# Patient Record
Sex: FEMALE | Race: WHITE | Employment: OTHER | URBAN - NONMETROPOLITAN AREA
[De-identification: names, ages, dates, MRNs, and addresses within clinical notes are randomized per-mention and may not be internally consistent; named-entity substitution may affect disease eponyms.]

---

## 2017-04-25 ENCOUNTER — OFFICE VISIT (OUTPATIENT)
Dept: FAMILY MEDICINE CLINIC | Age: 82
End: 2017-04-25
Payer: MEDICARE

## 2017-04-25 VITALS
WEIGHT: 126 LBS | BODY MASS INDEX: 25.4 KG/M2 | OXYGEN SATURATION: 97 % | SYSTOLIC BLOOD PRESSURE: 128 MMHG | HEIGHT: 59 IN | HEART RATE: 54 BPM | DIASTOLIC BLOOD PRESSURE: 60 MMHG

## 2017-04-25 DIAGNOSIS — H35.3190 MACULAR DEGENERATION, DRY: ICD-10-CM

## 2017-04-25 DIAGNOSIS — Z09 HOSPITAL DISCHARGE FOLLOW-UP: ICD-10-CM

## 2017-04-25 DIAGNOSIS — E03.9 HYPOTHYROIDISM, UNSPECIFIED TYPE: ICD-10-CM

## 2017-04-25 DIAGNOSIS — I10 ESSENTIAL HYPERTENSION: ICD-10-CM

## 2017-04-25 DIAGNOSIS — E78.1 HYPERTRIGLYCERIDEMIA: ICD-10-CM

## 2017-04-25 PROCEDURE — G8420 CALC BMI NORM PARAMETERS: HCPCS | Performed by: FAMILY MEDICINE

## 2017-04-25 PROCEDURE — 1036F TOBACCO NON-USER: CPT | Performed by: FAMILY MEDICINE

## 2017-04-25 PROCEDURE — 1123F ACP DISCUSS/DSCN MKR DOCD: CPT | Performed by: FAMILY MEDICINE

## 2017-04-25 PROCEDURE — 99214 OFFICE O/P EST MOD 30 MIN: CPT | Performed by: FAMILY MEDICINE

## 2017-04-25 PROCEDURE — 4040F PNEUMOC VAC/ADMIN/RCVD: CPT | Performed by: FAMILY MEDICINE

## 2017-04-25 PROCEDURE — 1090F PRES/ABSN URINE INCON ASSESS: CPT | Performed by: FAMILY MEDICINE

## 2017-04-25 PROCEDURE — G8427 DOCREV CUR MEDS BY ELIG CLIN: HCPCS | Performed by: FAMILY MEDICINE

## 2017-08-01 ENCOUNTER — TELEPHONE (OUTPATIENT)
Dept: FAMILY MEDICINE CLINIC | Age: 82
End: 2017-08-01

## 2017-08-07 ENCOUNTER — TELEPHONE (OUTPATIENT)
Dept: FAMILY MEDICINE CLINIC | Age: 82
End: 2017-08-07

## 2017-08-17 ENCOUNTER — OFFICE VISIT (OUTPATIENT)
Dept: OPTOMETRY | Age: 82
End: 2017-08-17
Payer: MEDICARE

## 2017-08-17 DIAGNOSIS — H52.03 HYPEROPIA OF BOTH EYES WITH ASTIGMATISM AND PRESBYOPIA: Primary | ICD-10-CM

## 2017-08-17 DIAGNOSIS — H52.203 HYPEROPIA OF BOTH EYES WITH ASTIGMATISM AND PRESBYOPIA: Primary | ICD-10-CM

## 2017-08-17 DIAGNOSIS — H52.4 HYPEROPIA OF BOTH EYES WITH ASTIGMATISM AND PRESBYOPIA: Primary | ICD-10-CM

## 2017-08-17 PROCEDURE — G8419 CALC BMI OUT NRM PARAM NOF/U: HCPCS | Performed by: OPTOMETRIST

## 2017-08-17 PROCEDURE — 1123F ACP DISCUSS/DSCN MKR DOCD: CPT | Performed by: OPTOMETRIST

## 2017-08-17 PROCEDURE — G8427 DOCREV CUR MEDS BY ELIG CLIN: HCPCS | Performed by: OPTOMETRIST

## 2017-08-17 PROCEDURE — 99213 OFFICE O/P EST LOW 20 MIN: CPT | Performed by: OPTOMETRIST

## 2017-08-17 PROCEDURE — 4040F PNEUMOC VAC/ADMIN/RCVD: CPT | Performed by: OPTOMETRIST

## 2017-08-17 PROCEDURE — 1036F TOBACCO NON-USER: CPT | Performed by: OPTOMETRIST

## 2017-08-17 PROCEDURE — 1090F PRES/ABSN URINE INCON ASSESS: CPT | Performed by: OPTOMETRIST

## 2017-08-17 RX ORDER — BENOXINATE HCL/FLUORESCEIN SOD 0.4%-0.25%
1 DROPS OPHTHALMIC (EYE) ONCE
Status: COMPLETED | OUTPATIENT
Start: 2017-08-17 | End: 2017-08-17

## 2017-08-17 RX ADMIN — Medication 1 DROP: at 09:03

## 2017-08-17 ASSESSMENT — REFRACTION_WEARINGRX
OD_AXIS: 120
OS_SPHERE: +3.25
OS_AXIS: 104
OD_ADD: +2.75
OD_CYLINDER: -0.50
OS_ADD: +2.75
SPECS_TYPE: BIFOCAL
OD_SPHERE: +2.50
OS_CYLINDER: -1.00

## 2017-08-17 ASSESSMENT — VISUAL ACUITY
OD_CC: 20/50 OU
CORRECTION_TYPE: GLASSES
OS_CC: 20/50
METHOD: SNELLEN - LINEAR

## 2017-08-17 ASSESSMENT — REFRACTION_MANIFEST
OD_CYLINDER: -0.50
OD_SPHERE: +2.75
OD_ADD: +2.75
OS_SPHERE: +3.50
OD_AXIS: 010
OS_AXIS: 090
OS_ADD: +2.75
OS_CYLINDER: -1.00

## 2017-08-17 ASSESSMENT — TONOMETRY
OD_IOP_MMHG: 18
OS_IOP_MMHG: 18
IOP_METHOD: APPLANATION W FLURESS DROP

## 2017-08-17 ASSESSMENT — SLIT LAMP EXAM - LIDS
COMMENTS: NORMAL
COMMENTS: NORMAL

## 2017-10-16 ENCOUNTER — HOSPITAL ENCOUNTER (OUTPATIENT)
Dept: LAB | Age: 82
Setting detail: SPECIMEN
Discharge: HOME | End: 2017-10-16
Payer: MEDICARE

## 2017-10-16 DIAGNOSIS — R30.0 DYSURIA: Primary | ICD-10-CM

## 2017-10-16 DIAGNOSIS — R30.0 DYSURIA: ICD-10-CM

## 2017-10-16 LAB
-: ABNORMAL
AMORPHOUS: ABNORMAL
BACTERIA: ABNORMAL
BILIRUBIN URINE: NEGATIVE
CASTS UA: ABNORMAL /LPF (ref 0–2)
COLOR: ABNORMAL
COMMENT UA: ABNORMAL
CRYSTALS, UA: ABNORMAL /HPF
EPITHELIAL CELLS UA: ABNORMAL /HPF (ref 0–5)
GLUCOSE URINE: NEGATIVE
KETONES, URINE: NEGATIVE
LEUKOCYTE ESTERASE, URINE: ABNORMAL
MUCUS: ABNORMAL
NITRITE, URINE: NEGATIVE
OTHER OBSERVATIONS UA: ABNORMAL
PH UA: 6 (ref 5–6)
PROTEIN UA: ABNORMAL
RBC UA: ABNORMAL /HPF (ref 0–4)
RENAL EPITHELIAL, UA: ABNORMAL /HPF
SPECIFIC GRAVITY UA: 1.02 (ref 1.01–1.02)
TRICHOMONAS: ABNORMAL
TURBIDITY: ABNORMAL
URINE HGB: ABNORMAL
UROBILINOGEN, URINE: NORMAL
WBC UA: >50 /HPF (ref 0–4)
YEAST: ABNORMAL

## 2017-10-16 PROCEDURE — 87186 SC STD MICRODIL/AGAR DIL: CPT

## 2017-10-16 PROCEDURE — 87088 URINE BACTERIA CULTURE: CPT

## 2017-10-16 PROCEDURE — 81001 URINALYSIS AUTO W/SCOPE: CPT

## 2017-10-16 PROCEDURE — 87086 URINE CULTURE/COLONY COUNT: CPT

## 2017-10-17 ENCOUNTER — TELEPHONE (OUTPATIENT)
Dept: FAMILY MEDICINE CLINIC | Age: 82
End: 2017-10-17

## 2017-10-18 ENCOUNTER — TELEPHONE (OUTPATIENT)
Dept: FAMILY MEDICINE CLINIC | Age: 82
End: 2017-10-18

## 2017-10-18 LAB
CULTURE: ABNORMAL
CULTURE: ABNORMAL
Lab: ABNORMAL
ORGANISM: ABNORMAL
SPECIMEN DESCRIPTION: ABNORMAL
SPECIMEN DESCRIPTION: ABNORMAL
STATUS: ABNORMAL

## 2017-10-18 NOTE — TELEPHONE ENCOUNTER
----- Message from Lauren Austin MD sent at 10/18/2017 10:46 AM EDT -----  Was called in bactrim yesterday ,based on urine culture senstivity will switch to cipro 550 mg bid x 7days,script to be called in ,stop bactrim.

## 2017-10-25 ENCOUNTER — HOSPITAL ENCOUNTER (OUTPATIENT)
Age: 82
Setting detail: SPECIMEN
Discharge: HOME | End: 2017-10-25
Payer: MEDICARE

## 2017-10-25 ENCOUNTER — OFFICE VISIT (OUTPATIENT)
Dept: FAMILY MEDICINE CLINIC | Age: 82
End: 2017-10-25
Payer: MEDICARE

## 2017-10-25 VITALS
SYSTOLIC BLOOD PRESSURE: 142 MMHG | HEART RATE: 64 BPM | TEMPERATURE: 97.2 F | DIASTOLIC BLOOD PRESSURE: 70 MMHG | RESPIRATION RATE: 16 BRPM | BODY MASS INDEX: 24.52 KG/M2 | WEIGHT: 121.6 LBS | HEIGHT: 59 IN

## 2017-10-25 DIAGNOSIS — E78.1 HYPERTRIGLYCERIDEMIA: ICD-10-CM

## 2017-10-25 DIAGNOSIS — N39.0 URINARY TRACT INFECTION WITHOUT HEMATURIA, SITE UNSPECIFIED: ICD-10-CM

## 2017-10-25 DIAGNOSIS — N39.0 URINARY TRACT INFECTION WITHOUT HEMATURIA, SITE UNSPECIFIED: Primary | ICD-10-CM

## 2017-10-25 DIAGNOSIS — I10 ESSENTIAL HYPERTENSION: Primary | ICD-10-CM

## 2017-10-25 DIAGNOSIS — E03.9 HYPOTHYROIDISM, UNSPECIFIED TYPE: ICD-10-CM

## 2017-10-25 LAB
-: ABNORMAL
AMORPHOUS: ABNORMAL
BACTERIA: ABNORMAL
BILIRUBIN URINE: NEGATIVE
CASTS UA: ABNORMAL /LPF (ref 0–2)
COLOR: ABNORMAL
COMMENT UA: ABNORMAL
CRYSTALS, UA: ABNORMAL /HPF
EPITHELIAL CELLS UA: ABNORMAL /HPF (ref 0–5)
GLUCOSE URINE: NEGATIVE
KETONES, URINE: NEGATIVE
LEUKOCYTE ESTERASE, URINE: ABNORMAL
MUCUS: ABNORMAL
NITRITE, URINE: NEGATIVE
OTHER OBSERVATIONS UA: ABNORMAL
PH UA: 5.5 (ref 5–6)
PROTEIN UA: NEGATIVE
RBC UA: ABNORMAL /HPF (ref 0–4)
RENAL EPITHELIAL, UA: ABNORMAL /HPF
SPECIFIC GRAVITY UA: 1.01 (ref 1.01–1.02)
TRICHOMONAS: ABNORMAL
TURBIDITY: ABNORMAL
URINE HGB: NEGATIVE
UROBILINOGEN, URINE: NORMAL
WBC UA: ABNORMAL /HPF (ref 0–4)
YEAST: ABNORMAL

## 2017-10-25 PROCEDURE — G8484 FLU IMMUNIZE NO ADMIN: HCPCS | Performed by: FAMILY MEDICINE

## 2017-10-25 PROCEDURE — 87086 URINE CULTURE/COLONY COUNT: CPT

## 2017-10-25 PROCEDURE — 81001 URINALYSIS AUTO W/SCOPE: CPT

## 2017-10-25 PROCEDURE — G8420 CALC BMI NORM PARAMETERS: HCPCS | Performed by: FAMILY MEDICINE

## 2017-10-25 PROCEDURE — 1036F TOBACCO NON-USER: CPT | Performed by: FAMILY MEDICINE

## 2017-10-25 PROCEDURE — G8427 DOCREV CUR MEDS BY ELIG CLIN: HCPCS | Performed by: FAMILY MEDICINE

## 2017-10-25 PROCEDURE — 1123F ACP DISCUSS/DSCN MKR DOCD: CPT | Performed by: FAMILY MEDICINE

## 2017-10-25 PROCEDURE — 1090F PRES/ABSN URINE INCON ASSESS: CPT | Performed by: FAMILY MEDICINE

## 2017-10-25 PROCEDURE — 99214 OFFICE O/P EST MOD 30 MIN: CPT | Performed by: FAMILY MEDICINE

## 2017-10-25 PROCEDURE — 4040F PNEUMOC VAC/ADMIN/RCVD: CPT | Performed by: FAMILY MEDICINE

## 2017-10-25 RX ORDER — CARVEDILOL 12.5 MG/1
TABLET ORAL
Qty: 180 TABLET | Refills: 2 | Status: SHIPPED | OUTPATIENT
Start: 2017-10-25 | End: 2018-04-25 | Stop reason: SDUPTHER

## 2017-10-25 RX ORDER — BENAZEPRIL HYDROCHLORIDE 40 MG/1
40 TABLET, FILM COATED ORAL DAILY
Qty: 90 TABLET | Refills: 2 | Status: SHIPPED | OUTPATIENT
Start: 2017-10-25 | End: 2018-04-25 | Stop reason: SDUPTHER

## 2017-10-25 RX ORDER — POTASSIUM CHLORIDE 750 MG/1
TABLET, EXTENDED RELEASE ORAL
Qty: 540 TABLET | Refills: 2 | Status: SHIPPED | OUTPATIENT
Start: 2017-10-25 | End: 2018-04-25 | Stop reason: SDUPTHER

## 2017-10-25 RX ORDER — LEVOTHYROXINE SODIUM 0.07 MG/1
TABLET ORAL
Qty: 90 TABLET | Refills: 2 | Status: SHIPPED | OUTPATIENT
Start: 2017-10-25 | End: 2018-04-25 | Stop reason: SDUPTHER

## 2017-10-25 RX ORDER — HYDROCHLOROTHIAZIDE 50 MG/1
50 TABLET ORAL DAILY
Qty: 90 TABLET | Refills: 2 | Status: SHIPPED | OUTPATIENT
Start: 2017-10-25 | End: 2018-04-25 | Stop reason: SDUPTHER

## 2017-10-25 RX ORDER — CLONIDINE HYDROCHLORIDE 0.1 MG/1
0.1 TABLET ORAL DAILY
Qty: 90 TABLET | Refills: 2 | Status: SHIPPED | OUTPATIENT
Start: 2017-10-25 | End: 2018-04-25 | Stop reason: ALTCHOICE

## 2017-10-25 RX ORDER — SELENIUM 50 MCG
1 TABLET ORAL DAILY
Qty: 90 CAPSULE | Refills: 2 | Status: SHIPPED | OUTPATIENT
Start: 2017-10-25 | End: 2018-04-25 | Stop reason: SDUPTHER

## 2017-10-25 NOTE — PROGRESS NOTES
AREDS 2) CAPS Take 1 capsule by mouth 2 times daily      cloNIDine (CATAPRES) 0.1 MG tablet Take 1 tablet by mouth daily 30 tablet 1    benazepril (LOTENSIN) 40 MG tablet Take 1 tablet by mouth daily 90 tablet 2    carvedilol (COREG) 12.5 MG tablet take 1 tablet by mouth twice a day 180 tablet 2    hydrochlorothiazide (HYDRODIURIL) 50 MG tablet Take 1 tablet by mouth daily 90 tablet 2    Lactobacillus (ACIDOPHILUS) CAPS capsule Take 1 capsule by mouth daily 90 capsule 1    levothyroxine (SYNTHROID) 75 MCG tablet take 1 tablet by mouth once daily 90 tablet 2    potassium chloride SA (K-DUR;KLOR-CON M) 10 MEQ tablet 3 TABLETS  tablet 2    Ascorbic Acid (VITAMIN C) 500 MG tablet Take 500 mg by mouth daily      carboxymethylcellulose 1 % ophthalmic solution Place 1 drop into both eyes as needed for Dry Eyes.  Multiple Vitamins-Minerals (PRESERVISION AREDS) TABS Take  by mouth 2 times daily.  aspirin 81 MG tablet Take 81 mg by mouth daily.  MULTIPLE VITAMIN PO Take  by mouth daily.  Coenzyme Q10 (COQ10) 100 MG CAPS Take 100 mg by mouth 2 times daily.  Magnesium 200 MG TABS Take  by mouth daily. No current facility-administered medications for this visit. REVIEW OF SYSTEMS:  General: No fevers, chills, change in weight  HEENT: No double vision,has blurry vision right eye,no runny nose, sore throat and tinnitus. Has macular degeneration eyes. Cardio: No chest pain, palpitations, ADDISON, edema, PND  Pulmonary: No cough, hemoptysis, SOB  GI: No nausea, vomiting, dysphagia, odynophagia, diarrhea, constipation. has had colonoscopy last week. : No dysuria, hematuria, urgency, incontinence  Musculoskeletal: Has joint pain in hands, no joint swelling  Neuro:No dizziness/lightheadedness, no seizures  Endocrine: No polyuria, polydipsia, polyphagia, no temperature intolerance  Skin:No lesions or itching.   No problems with ADLs  Sleep: good  Psychiatric:No depression  PHYSICAL EXAM:  VS:    BP (!) 142/70   Pulse 64   Temp 97.2 °F (36.2 °C)   Resp 16   Ht 4' 11\" (1.499 m)   Wt 121 lb 9.6 oz (55.2 kg)   BMI 24.56 kg/m²   General:  Alert and oriented, NAD  HEENT:  TMs, SHILA, EOMI, Conjunctivae clear       Throat currently clear  NECK:  Supple without adenopathy or thyromegaly, no carotid bruits  LUNGS:  CTA all fields  HEART:  RRR without M, R, or G  ABDOMEN:  Soft and nontender without palpable abnormalities  EXTREMITIES:  Without clubbing, cyanosis, or edema, no calf tenderness. arthritic changes in fingers. NEURO:No focal deficits. SKIN:warm to touch,normal texture. keratotic lesions in forearms. ASSESSMENT/PLAN:    1. Essential hypertension  cloNIDine (CATAPRES) 0.1 MG tablet    benazepril (LOTENSIN) 40 MG tablet    carvedilol (COREG) 12.5 MG tablet    hydrochlorothiazide (HYDRODIURIL) 50 MG tablet    potassium chloride (KLOR-CON M) 10 MEQ extended release tablet   2. Hypothyroidism, unspecified type  levothyroxine (SYNTHROID) 75 MCG tablet   3. Hypertriglyceridemia     4. Urinary tract infection without hematuria, site unspecified  Urinalysis With Microscopic       Orders Placed This Encounter   Procedures    Urinalysis With Microscopic     Standing Status:   Future     Standing Expiration Date:   10/25/2018     Order Specific Question:   SPECIFY(EX-CATH,MIDSTREAM,CYSTO,ETC)?      Answer:   midstream     Requested Prescriptions     Pending Prescriptions Disp Refills    cloNIDine (CATAPRES) 0.1 MG tablet 90 tablet 2     Sig: Take 1 tablet by mouth daily    benazepril (LOTENSIN) 40 MG tablet 90 tablet 2     Sig: Take 1 tablet by mouth daily    carvedilol (COREG) 12.5 MG tablet 180 tablet 2     Sig: take 1 tablet by mouth twice a day    hydrochlorothiazide (HYDRODIURIL) 50 MG tablet 90 tablet 2     Sig: Take 1 tablet by mouth daily    Lactobacillus (ACIDOPHILUS) CAPS capsule 90 capsule 2     Sig: Take 1 capsule by mouth daily    levothyroxine (SYNTHROID) 75 MCG tablet 90 tablet 2     Sig: take 1 tablet by mouth once daily    potassium chloride (KLOR-CON M) 10 MEQ extended release tablet 540 tablet 2     Sig: 3 TABLETS BID   Continue current meds  Looks like UTI has resolved ,will await UA results. Labs pending next week. Declined flu vaccine  To follow up sooner if BP is elevated. Return in about 6 months (around 4/25/2018).

## 2017-10-26 ENCOUNTER — HOSPITAL ENCOUNTER (OUTPATIENT)
Dept: LAB | Age: 82
Setting detail: SPECIMEN
Discharge: HOME | End: 2017-10-26
Payer: MEDICARE

## 2017-10-26 DIAGNOSIS — N39.0 URINARY TRACT INFECTION WITHOUT HEMATURIA, SITE UNSPECIFIED: ICD-10-CM

## 2017-10-26 DIAGNOSIS — E03.9 HYPOTHYROIDISM, UNSPECIFIED TYPE: ICD-10-CM

## 2017-10-26 DIAGNOSIS — E78.1 HYPERTRIGLYCERIDEMIA: ICD-10-CM

## 2017-10-26 DIAGNOSIS — I10 ESSENTIAL HYPERTENSION: ICD-10-CM

## 2017-10-26 LAB
ALBUMIN SERPL-MCNC: 4.2 G/DL (ref 3.5–5.2)
ALBUMIN/GLOBULIN RATIO: 1.7 (ref 1–2.5)
ALP BLD-CCNC: 101 U/L (ref 35–104)
ALT SERPL-CCNC: 16 U/L (ref 5–33)
ANION GAP SERPL CALCULATED.3IONS-SCNC: 9 MMOL/L (ref 9–17)
AST SERPL-CCNC: 20 U/L
BILIRUB SERPL-MCNC: 1.06 MG/DL (ref 0.3–1.2)
BUN BLDV-MCNC: 15 MG/DL (ref 8–23)
BUN/CREAT BLD: 34 (ref 9–20)
CALCIUM SERPL-MCNC: 9.3 MG/DL (ref 8.6–10.4)
CHLORIDE BLD-SCNC: 88 MMOL/L (ref 98–107)
CHOLESTEROL/HDL RATIO: 3.8
CHOLESTEROL: 195 MG/DL
CO2: 30 MMOL/L (ref 20–31)
CREAT SERPL-MCNC: 0.44 MG/DL (ref 0.5–0.9)
GFR AFRICAN AMERICAN: >60 ML/MIN
GFR NON-AFRICAN AMERICAN: >60 ML/MIN
GFR SERPL CREATININE-BSD FRML MDRD: ABNORMAL ML/MIN/{1.73_M2}
GFR SERPL CREATININE-BSD FRML MDRD: ABNORMAL ML/MIN/{1.73_M2}
GLUCOSE BLD-MCNC: 109 MG/DL (ref 70–99)
HCT VFR BLD CALC: 37.8 % (ref 36–46)
HDLC SERPL-MCNC: 52 MG/DL
HEMOGLOBIN: 13.4 G/DL (ref 12–16)
LDL CHOLESTEROL: 123 MG/DL (ref 0–130)
MCH RBC QN AUTO: 31.5 PG (ref 26–34)
MCHC RBC AUTO-ENTMCNC: 35.5 G/DL (ref 31–37)
MCV RBC AUTO: 88.7 FL (ref 80–100)
PDW BLD-RTO: 13.6 % (ref 11–14.5)
PLATELET # BLD: 197 K/UL (ref 140–450)
PMV BLD AUTO: 8 FL (ref 6–12)
POTASSIUM SERPL-SCNC: 4.8 MMOL/L (ref 3.7–5.3)
RBC # BLD: 4.26 M/UL (ref 4–5.2)
SODIUM BLD-SCNC: 127 MMOL/L (ref 135–144)
TOTAL PROTEIN: 6.7 G/DL (ref 6.4–8.3)
TRIGL SERPL-MCNC: 99 MG/DL
TSH SERPL DL<=0.05 MIU/L-ACNC: 5.13 MIU/L (ref 0.3–5)
VLDLC SERPL CALC-MCNC: NORMAL MG/DL (ref 1–30)
WBC # BLD: 6.5 K/UL (ref 3.5–11)

## 2017-10-26 PROCEDURE — 36415 COLL VENOUS BLD VENIPUNCTURE: CPT

## 2017-10-26 PROCEDURE — 80061 LIPID PANEL: CPT

## 2017-10-26 PROCEDURE — 85027 COMPLETE CBC AUTOMATED: CPT

## 2017-10-26 PROCEDURE — 80053 COMPREHEN METABOLIC PANEL: CPT

## 2017-10-26 PROCEDURE — 84443 ASSAY THYROID STIM HORMONE: CPT

## 2017-10-27 DIAGNOSIS — E87.1 LOW SODIUM LEVELS: Primary | ICD-10-CM

## 2017-10-27 LAB
CULTURE: NORMAL
CULTURE: NORMAL
Lab: NORMAL
SPECIMEN DESCRIPTION: NORMAL
SPECIMEN DESCRIPTION: NORMAL
STATUS: NORMAL

## 2017-10-30 ENCOUNTER — TELEPHONE (OUTPATIENT)
Dept: FAMILY MEDICINE CLINIC | Age: 82
End: 2017-10-30

## 2017-10-30 NOTE — TELEPHONE ENCOUNTER
Can hold off on clonidine for now,however if BP remains elevated would like her to start back on clonidine and follow up.

## 2017-10-30 NOTE — TELEPHONE ENCOUNTER
Pt states she has never taken the Catapres before, has only been on benazepril and carvdilol for BP. Would you like her to also start the catapres?

## 2017-10-30 NOTE — TELEPHONE ENCOUNTER
Pt calling stating when she picked up her meds she had an extra med called clonidine, pt states she's never been on this and it was not mentioned at her visit, pt is questioning if this is correct and would like something information, please advise at above number.

## 2017-11-14 ENCOUNTER — HOSPITAL ENCOUNTER (OUTPATIENT)
Dept: LAB | Age: 82
Setting detail: SPECIMEN
Discharge: HOME | End: 2017-11-14
Payer: MEDICARE

## 2017-11-14 DIAGNOSIS — E87.1 LOW SODIUM LEVELS: ICD-10-CM

## 2017-11-14 LAB
ANION GAP SERPL CALCULATED.3IONS-SCNC: 10 MMOL/L (ref 9–17)
BUN BLDV-MCNC: 20 MG/DL (ref 8–23)
BUN/CREAT BLD: 42 (ref 9–20)
CALCIUM SERPL-MCNC: 9.4 MG/DL (ref 8.6–10.4)
CHLORIDE BLD-SCNC: 95 MMOL/L (ref 98–107)
CO2: 31 MMOL/L (ref 20–31)
CREAT SERPL-MCNC: 0.48 MG/DL (ref 0.5–0.9)
GFR AFRICAN AMERICAN: >60 ML/MIN
GFR NON-AFRICAN AMERICAN: >60 ML/MIN
GFR SERPL CREATININE-BSD FRML MDRD: ABNORMAL ML/MIN/{1.73_M2}
GFR SERPL CREATININE-BSD FRML MDRD: ABNORMAL ML/MIN/{1.73_M2}
GLUCOSE BLD-MCNC: 115 MG/DL (ref 70–99)
POTASSIUM SERPL-SCNC: 4.1 MMOL/L (ref 3.7–5.3)
SODIUM BLD-SCNC: 136 MMOL/L (ref 135–144)

## 2017-11-14 PROCEDURE — 36415 COLL VENOUS BLD VENIPUNCTURE: CPT

## 2017-11-14 PROCEDURE — 80048 BASIC METABOLIC PNL TOTAL CA: CPT

## 2018-04-17 ENCOUNTER — TELEPHONE (OUTPATIENT)
Dept: FAMILY MEDICINE CLINIC | Age: 83
End: 2018-04-17

## 2018-04-17 DIAGNOSIS — I10 ESSENTIAL HYPERTENSION: Primary | ICD-10-CM

## 2018-04-17 DIAGNOSIS — E03.9 HYPOTHYROIDISM, UNSPECIFIED TYPE: ICD-10-CM

## 2018-04-17 DIAGNOSIS — E78.1 HYPERTRIGLYCERIDEMIA: ICD-10-CM

## 2018-04-18 ENCOUNTER — HOSPITAL ENCOUNTER (OUTPATIENT)
Dept: LAB | Age: 83
Setting detail: SPECIMEN
Discharge: HOME | End: 2018-04-18
Payer: MEDICARE

## 2018-04-18 DIAGNOSIS — E78.1 HYPERTRIGLYCERIDEMIA: ICD-10-CM

## 2018-04-18 DIAGNOSIS — E03.9 HYPOTHYROIDISM, UNSPECIFIED TYPE: ICD-10-CM

## 2018-04-18 DIAGNOSIS — I10 ESSENTIAL HYPERTENSION: ICD-10-CM

## 2018-04-18 LAB
ABSOLUTE EOS #: 0.2 K/UL (ref 0–0.4)
ABSOLUTE IMMATURE GRANULOCYTE: NORMAL K/UL (ref 0–0.3)
ABSOLUTE LYMPH #: 1.9 K/UL (ref 1–4.8)
ABSOLUTE MONO #: 0.6 K/UL (ref 0.1–1.2)
ALBUMIN SERPL-MCNC: 3.9 G/DL (ref 3.5–5.2)
ALBUMIN/GLOBULIN RATIO: 1.3 (ref 1–2.5)
ALP BLD-CCNC: 107 U/L (ref 35–104)
ALT SERPL-CCNC: 13 U/L (ref 5–33)
ANION GAP SERPL CALCULATED.3IONS-SCNC: 8 MMOL/L (ref 9–17)
AST SERPL-CCNC: 17 U/L
BASOPHILS # BLD: 0 % (ref 0–1)
BASOPHILS ABSOLUTE: 0 K/UL (ref 0–0.2)
BILIRUB SERPL-MCNC: 1.08 MG/DL (ref 0.3–1.2)
BUN BLDV-MCNC: 22 MG/DL (ref 8–23)
BUN/CREAT BLD: 43 (ref 9–20)
CALCIUM SERPL-MCNC: 9.5 MG/DL (ref 8.6–10.4)
CHLORIDE BLD-SCNC: 96 MMOL/L (ref 98–107)
CHOLESTEROL/HDL RATIO: 3.9
CHOLESTEROL: 195 MG/DL
CO2: 32 MMOL/L (ref 20–31)
CREAT SERPL-MCNC: 0.51 MG/DL (ref 0.5–0.9)
DIFFERENTIAL TYPE: NORMAL
EOSINOPHILS RELATIVE PERCENT: 4 % (ref 1–7)
GFR AFRICAN AMERICAN: >60 ML/MIN
GFR NON-AFRICAN AMERICAN: >60 ML/MIN
GFR SERPL CREATININE-BSD FRML MDRD: ABNORMAL ML/MIN/{1.73_M2}
GFR SERPL CREATININE-BSD FRML MDRD: ABNORMAL ML/MIN/{1.73_M2}
GLUCOSE BLD-MCNC: 99 MG/DL (ref 70–99)
HCT VFR BLD CALC: 38.6 % (ref 36–46)
HDLC SERPL-MCNC: 50 MG/DL
HEMOGLOBIN: 13 G/DL (ref 12–16)
IMMATURE GRANULOCYTES: NORMAL %
LDL CHOLESTEROL: 122 MG/DL (ref 0–130)
LYMPHOCYTES # BLD: 33 % (ref 16–46)
MCH RBC QN AUTO: 30.7 PG (ref 26–34)
MCHC RBC AUTO-ENTMCNC: 33.7 G/DL (ref 31–37)
MCV RBC AUTO: 90.9 FL (ref 80–100)
MONOCYTES # BLD: 9 % (ref 4–11)
NRBC AUTOMATED: NORMAL PER 100 WBC
PDW BLD-RTO: 13.8 % (ref 11–14.5)
PLATELET # BLD: 167 K/UL (ref 140–450)
PLATELET ESTIMATE: NORMAL
PMV BLD AUTO: 8.2 FL (ref 6–12)
POTASSIUM SERPL-SCNC: 4.4 MMOL/L (ref 3.7–5.3)
RBC # BLD: 4.25 M/UL (ref 4–5.2)
RBC # BLD: NORMAL 10*6/UL
SEG NEUTROPHILS: 54 % (ref 43–77)
SEGMENTED NEUTROPHILS ABSOLUTE COUNT: 3.2 K/UL (ref 1.8–7.7)
SODIUM BLD-SCNC: 136 MMOL/L (ref 135–144)
TOTAL PROTEIN: 6.8 G/DL (ref 6.4–8.3)
TRIGL SERPL-MCNC: 113 MG/DL
TSH SERPL DL<=0.05 MIU/L-ACNC: 4.83 MIU/L (ref 0.3–5)
VLDLC SERPL CALC-MCNC: NORMAL MG/DL (ref 1–30)
WBC # BLD: 6 K/UL (ref 3.5–11)
WBC # BLD: NORMAL 10*3/UL

## 2018-04-18 PROCEDURE — 84443 ASSAY THYROID STIM HORMONE: CPT

## 2018-04-18 PROCEDURE — 80053 COMPREHEN METABOLIC PANEL: CPT

## 2018-04-18 PROCEDURE — 85025 COMPLETE CBC W/AUTO DIFF WBC: CPT

## 2018-04-18 PROCEDURE — 80061 LIPID PANEL: CPT

## 2018-04-18 PROCEDURE — 36415 COLL VENOUS BLD VENIPUNCTURE: CPT

## 2018-04-25 ENCOUNTER — OFFICE VISIT (OUTPATIENT)
Dept: FAMILY MEDICINE CLINIC | Age: 83
End: 2018-04-25
Payer: MEDICARE

## 2018-04-25 VITALS
DIASTOLIC BLOOD PRESSURE: 64 MMHG | BODY MASS INDEX: 25.2 KG/M2 | HEART RATE: 62 BPM | HEIGHT: 59 IN | WEIGHT: 125 LBS | OXYGEN SATURATION: 95 % | RESPIRATION RATE: 16 BRPM | SYSTOLIC BLOOD PRESSURE: 152 MMHG

## 2018-04-25 DIAGNOSIS — I10 ESSENTIAL HYPERTENSION: ICD-10-CM

## 2018-04-25 DIAGNOSIS — H35.3190 MACULAR DEGENERATION, DRY: Primary | ICD-10-CM

## 2018-04-25 DIAGNOSIS — E03.9 HYPOTHYROIDISM, UNSPECIFIED TYPE: ICD-10-CM

## 2018-04-25 PROCEDURE — 1036F TOBACCO NON-USER: CPT | Performed by: FAMILY MEDICINE

## 2018-04-25 PROCEDURE — 4040F PNEUMOC VAC/ADMIN/RCVD: CPT | Performed by: FAMILY MEDICINE

## 2018-04-25 PROCEDURE — 99214 OFFICE O/P EST MOD 30 MIN: CPT | Performed by: FAMILY MEDICINE

## 2018-04-25 PROCEDURE — 1123F ACP DISCUSS/DSCN MKR DOCD: CPT | Performed by: FAMILY MEDICINE

## 2018-04-25 PROCEDURE — 1090F PRES/ABSN URINE INCON ASSESS: CPT | Performed by: FAMILY MEDICINE

## 2018-04-25 PROCEDURE — G8417 CALC BMI ABV UP PARAM F/U: HCPCS | Performed by: FAMILY MEDICINE

## 2018-04-25 PROCEDURE — G8427 DOCREV CUR MEDS BY ELIG CLIN: HCPCS | Performed by: FAMILY MEDICINE

## 2018-04-25 RX ORDER — LEVOTHYROXINE SODIUM 0.07 MG/1
TABLET ORAL
Qty: 90 TABLET | Refills: 2 | Status: SHIPPED | OUTPATIENT
Start: 2018-04-25 | End: 2019-01-25 | Stop reason: SDUPTHER

## 2018-04-25 RX ORDER — BENAZEPRIL HYDROCHLORIDE 40 MG/1
40 TABLET, FILM COATED ORAL DAILY
Qty: 90 TABLET | Refills: 2 | Status: SHIPPED | OUTPATIENT
Start: 2018-04-25 | End: 2019-01-25 | Stop reason: SDUPTHER

## 2018-04-25 RX ORDER — HYDROCHLOROTHIAZIDE 50 MG/1
50 TABLET ORAL DAILY
Qty: 90 TABLET | Refills: 2 | Status: SHIPPED | OUTPATIENT
Start: 2018-04-25 | End: 2019-01-25 | Stop reason: SDUPTHER

## 2018-04-25 RX ORDER — POTASSIUM CHLORIDE 750 MG/1
TABLET, EXTENDED RELEASE ORAL
Qty: 540 TABLET | Refills: 2 | Status: SHIPPED | OUTPATIENT
Start: 2018-04-25 | End: 2019-01-25 | Stop reason: SDUPTHER

## 2018-04-25 RX ORDER — CARVEDILOL 12.5 MG/1
TABLET ORAL
Qty: 180 TABLET | Refills: 2 | Status: SHIPPED | OUTPATIENT
Start: 2018-04-25 | End: 2019-01-25 | Stop reason: SDUPTHER

## 2018-04-25 RX ORDER — SELENIUM 50 MCG
1 TABLET ORAL DAILY
Qty: 90 CAPSULE | Refills: 2 | Status: SHIPPED | OUTPATIENT
Start: 2018-04-25 | End: 2019-01-25 | Stop reason: SDUPTHER

## 2018-04-25 ASSESSMENT — PATIENT HEALTH QUESTIONNAIRE - PHQ9
1. LITTLE INTEREST OR PLEASURE IN DOING THINGS: 0
SUM OF ALL RESPONSES TO PHQ QUESTIONS 1-9: 0
SUM OF ALL RESPONSES TO PHQ9 QUESTIONS 1 & 2: 0
2. FEELING DOWN, DEPRESSED OR HOPELESS: 0

## 2018-06-18 ENCOUNTER — TELEPHONE (OUTPATIENT)
Dept: FAMILY MEDICINE CLINIC | Age: 83
End: 2018-06-18

## 2018-06-18 DIAGNOSIS — H35.30 MACULAR DEGENERATION: Primary | ICD-10-CM

## 2018-11-13 ENCOUNTER — OFFICE VISIT (OUTPATIENT)
Dept: FAMILY MEDICINE CLINIC | Age: 83
End: 2018-11-13
Payer: MEDICARE

## 2018-11-13 VITALS
BODY MASS INDEX: 26 KG/M2 | RESPIRATION RATE: 14 BRPM | WEIGHT: 129 LBS | OXYGEN SATURATION: 95 % | HEIGHT: 59 IN | DIASTOLIC BLOOD PRESSURE: 70 MMHG | HEART RATE: 68 BPM | SYSTOLIC BLOOD PRESSURE: 152 MMHG

## 2018-11-13 DIAGNOSIS — H91.90 DECREASED HEARING, UNSPECIFIED LATERALITY: ICD-10-CM

## 2018-11-13 DIAGNOSIS — E78.1 HYPERTRIGLYCERIDEMIA: ICD-10-CM

## 2018-11-13 DIAGNOSIS — E03.9 HYPOTHYROIDISM, UNSPECIFIED TYPE: ICD-10-CM

## 2018-11-13 DIAGNOSIS — I10 ESSENTIAL HYPERTENSION: Primary | ICD-10-CM

## 2018-11-13 PROCEDURE — 99214 OFFICE O/P EST MOD 30 MIN: CPT | Performed by: FAMILY MEDICINE

## 2018-11-13 PROCEDURE — G8417 CALC BMI ABV UP PARAM F/U: HCPCS | Performed by: FAMILY MEDICINE

## 2018-11-13 PROCEDURE — G8427 DOCREV CUR MEDS BY ELIG CLIN: HCPCS | Performed by: FAMILY MEDICINE

## 2018-11-13 PROCEDURE — 4040F PNEUMOC VAC/ADMIN/RCVD: CPT | Performed by: FAMILY MEDICINE

## 2018-11-13 PROCEDURE — G8484 FLU IMMUNIZE NO ADMIN: HCPCS | Performed by: FAMILY MEDICINE

## 2018-11-13 PROCEDURE — 1036F TOBACCO NON-USER: CPT | Performed by: FAMILY MEDICINE

## 2018-11-13 PROCEDURE — 1101F PT FALLS ASSESS-DOCD LE1/YR: CPT | Performed by: FAMILY MEDICINE

## 2018-11-13 PROCEDURE — 1123F ACP DISCUSS/DSCN MKR DOCD: CPT | Performed by: FAMILY MEDICINE

## 2018-11-13 PROCEDURE — 1090F PRES/ABSN URINE INCON ASSESS: CPT | Performed by: FAMILY MEDICINE

## 2018-11-13 ASSESSMENT — PATIENT HEALTH QUESTIONNAIRE - PHQ9
1. LITTLE INTEREST OR PLEASURE IN DOING THINGS: 0
SUM OF ALL RESPONSES TO PHQ QUESTIONS 1-9: 0
SUM OF ALL RESPONSES TO PHQ9 QUESTIONS 1 & 2: 0
SUM OF ALL RESPONSES TO PHQ QUESTIONS 1-9: 0
2. FEELING DOWN, DEPRESSED OR HOPELESS: 0

## 2018-11-13 NOTE — PROGRESS NOTES
HPI:  Patient comes in today for   Chief Complaint   Patient presents with    Hypertension     6 MONTH FOLLOW UP    Here for 6 month f/u,C/O Diminished hearing mostly left ear ,no ringing,recently had a sinus infection ,no drainage from ear. Her BP log from  home IS stable,has stopped taking catapress for sometime due to feeling tired and  It drops her BP. Has h/o HTN,Hyperlipidemia and hypothyroidism,stays fairly active,does her own household work. sleep is good. No h/o falls. Is seeing opthalmology for macular degeneration is stable. Recently passed her driving test.    . HISTORY:  Past Medical History:   Diagnosis Date    Cervical osteoarthritis     By history.  Dry eye syndrome     Early cataracts, bilateral     Euthyroid goiter     history of     Hypertension     Hypertriglyceridemia     Hypokalemia     Hypothyroidism     Low back pain     Macular degeneration     Navicular fracture     right, history of     Onychomycosis     Right 1, 2, Left 1, 3.    Osteopenia     Pneumonia 1985    Ptosis     Physiologic.  Vitreous degeneration        Family History   Problem Relation Age of Onset    Stroke Father     High Blood Pressure Mother     Stroke Sister     Other Daughter         Cerebral palsy.  Glaucoma Daughter     Cataracts Neg Hx     Diabetes Neg Hx        Social History     Social History    Marital status:      Spouse name: N/A    Number of children: N/A    Years of education: N/A     Occupational History    Not on file.      Social History Main Topics    Smoking status: Never Smoker    Smokeless tobacco: Never Used    Alcohol use No    Drug use: No    Sexual activity: Not on file     Other Topics Concern    Not on file     Social History Narrative    No narrative on file       Current Outpatient Prescriptions   Medication Sig Dispense Refill    Handicap Placard MISC by Does not apply route Expires in 5 years 1 each 0    Lactobacillus (ACIDOPHILUS) CAPS capsule Take 1 capsule by mouth daily 90 capsule 2    benazepril (LOTENSIN) 40 MG tablet Take 1 tablet by mouth daily 90 tablet 2    carvedilol (COREG) 12.5 MG tablet take 1 tablet by mouth twice a day 180 tablet 2    hydrochlorothiazide (HYDRODIURIL) 50 MG tablet Take 1 tablet by mouth daily 90 tablet 2    levothyroxine (SYNTHROID) 75 MCG tablet take 1 tablet by mouth once daily 90 tablet 2    potassium chloride (KLOR-CON M) 10 MEQ extended release tablet 3 TABLETS  tablet 2    Multiple Vitamins-Minerals (PRESERVISION AREDS 2) CAPS Take 1 capsule by mouth 2 times daily      Ascorbic Acid (VITAMIN C) 500 MG tablet Take 500 mg by mouth daily      carboxymethylcellulose 1 % ophthalmic solution Place 1 drop into both eyes as needed for Dry Eyes.  Multiple Vitamins-Minerals (PRESERVISION AREDS) TABS Take  by mouth 2 times daily.  aspirin 81 MG tablet Take 81 mg by mouth daily.  MULTIPLE VITAMIN PO Take  by mouth daily.  Coenzyme Q10 (COQ10) 100 MG CAPS Take 100 mg by mouth 2 times daily.  Magnesium 200 MG TABS Take  by mouth daily. No current facility-administered medications for this visit. REVIEW OF SYSTEMS:  General: No fevers, chills, change in weight  HEENT: No double vision,has blurry vision right eye,no runny nose, sore throat and tinnitus. Has macular degeneration eyes. diminished hearing left ear. Cardio: No chest pain, palpitations, ADDISON, edema, PND  Pulmonary: No cough, hemoptysis, SOB  GI: No nausea, vomiting, dysphagia, odynophagia, diarrhea, constipation. has had colonoscopy last week. : No dysuria, hematuria, urgency, incontinence  Musculoskeletal: Has joint pain in hands, no joint swelling  Neuro:No dizziness/lightheadedness, no seizures  Endocrine: No polyuria, polydipsia, polyphagia, no temperature intolerance  Skin:No lesions or itching.   No problems with ADLs  Sleep: good  Psychiatric:No depression  PHYSICAL EXAM:  VS:    BP (!) 152/70   Pulse 68   Resp 14

## 2019-01-01 ENCOUNTER — OFFICE VISIT (OUTPATIENT)
Dept: FAMILY MEDICINE CLINIC | Age: 84
End: 2019-01-01
Payer: MEDICARE

## 2019-01-01 ENCOUNTER — TELEPHONE (OUTPATIENT)
Dept: FAMILY MEDICINE CLINIC | Age: 84
End: 2019-01-01

## 2019-01-01 ENCOUNTER — HOSPITAL ENCOUNTER (OUTPATIENT)
Dept: LAB | Age: 84
Discharge: HOME | End: 2019-12-23
Payer: MEDICARE

## 2019-01-01 ENCOUNTER — HOSPITAL ENCOUNTER (OUTPATIENT)
Age: 84
Setting detail: SPECIMEN
Discharge: HOME | End: 2019-08-27
Payer: MEDICARE

## 2019-01-01 ENCOUNTER — CARE COORDINATION (OUTPATIENT)
Dept: CASE MANAGEMENT | Age: 84
End: 2019-01-01

## 2019-01-01 ENCOUNTER — APPOINTMENT (OUTPATIENT)
Dept: CT IMAGING | Age: 84
DRG: 641 | End: 2019-01-01
Payer: MEDICARE

## 2019-01-01 ENCOUNTER — TELEPHONE (OUTPATIENT)
Dept: LAB | Age: 84
End: 2019-01-01

## 2019-01-01 ENCOUNTER — HOSPITAL ENCOUNTER (OUTPATIENT)
Dept: LAB | Age: 84
Discharge: HOME | End: 2019-12-16
Payer: MEDICARE

## 2019-01-01 ENCOUNTER — HOSPITAL ENCOUNTER (OUTPATIENT)
Age: 84
Setting detail: SPECIMEN
Discharge: HOME | End: 2019-08-14
Payer: MEDICARE

## 2019-01-01 ENCOUNTER — OFFICE VISIT (OUTPATIENT)
Dept: VASCULAR SURGERY | Age: 84
End: 2019-01-01
Payer: MEDICARE

## 2019-01-01 ENCOUNTER — HOSPITAL ENCOUNTER (OUTPATIENT)
Dept: LAB | Age: 84
Discharge: HOME | End: 2019-12-30
Payer: MEDICARE

## 2019-01-01 ENCOUNTER — HOSPITAL ENCOUNTER (OUTPATIENT)
Dept: LAB | Age: 84
Discharge: HOME | End: 2019-12-02
Payer: MEDICARE

## 2019-01-01 ENCOUNTER — HOSPITAL ENCOUNTER (INPATIENT)
Age: 84
LOS: 2 days | Discharge: HOME | DRG: 641 | End: 2019-11-22
Attending: EMERGENCY MEDICINE | Admitting: INTERNAL MEDICINE
Payer: MEDICARE

## 2019-01-01 ENCOUNTER — PATIENT MESSAGE (OUTPATIENT)
Dept: FAMILY MEDICINE CLINIC | Age: 84
End: 2019-01-01

## 2019-01-01 ENCOUNTER — HOSPITAL ENCOUNTER (OUTPATIENT)
Dept: LAB | Age: 84
Discharge: HOME | End: 2019-11-15
Payer: MEDICARE

## 2019-01-01 ENCOUNTER — HOSPITAL ENCOUNTER (OUTPATIENT)
Dept: LAB | Age: 84
Discharge: HOME | End: 2019-12-09
Payer: MEDICARE

## 2019-01-01 ENCOUNTER — HOSPITAL ENCOUNTER (OUTPATIENT)
Dept: LAB | Age: 84
Discharge: HOME | End: 2019-05-20
Payer: MEDICARE

## 2019-01-01 ENCOUNTER — OFFICE VISIT (OUTPATIENT)
Dept: CARDIOLOGY | Age: 84
End: 2019-01-01
Payer: MEDICARE

## 2019-01-01 ENCOUNTER — HOSPITAL ENCOUNTER (OUTPATIENT)
Dept: LAB | Age: 84
Discharge: HOME | End: 2019-11-25
Payer: MEDICARE

## 2019-01-01 ENCOUNTER — HOSPITAL ENCOUNTER (OUTPATIENT)
Dept: LAB | Age: 84
Discharge: HOME | End: 2019-12-19
Payer: MEDICARE

## 2019-01-01 ENCOUNTER — OFFICE VISIT (OUTPATIENT)
Dept: PRIMARY CARE CLINIC | Age: 84
End: 2019-01-01
Payer: MEDICARE

## 2019-01-01 VITALS
HEART RATE: 62 BPM | WEIGHT: 128.6 LBS | DIASTOLIC BLOOD PRESSURE: 70 MMHG | TEMPERATURE: 98 F | RESPIRATION RATE: 16 BRPM | BODY MASS INDEX: 25.92 KG/M2 | HEIGHT: 59 IN | OXYGEN SATURATION: 90 % | SYSTOLIC BLOOD PRESSURE: 150 MMHG

## 2019-01-01 VITALS
BODY MASS INDEX: 25.6 KG/M2 | HEART RATE: 67 BPM | OXYGEN SATURATION: 91 % | DIASTOLIC BLOOD PRESSURE: 64 MMHG | SYSTOLIC BLOOD PRESSURE: 134 MMHG | WEIGHT: 127 LBS | HEIGHT: 59 IN

## 2019-01-01 VITALS
WEIGHT: 127 LBS | RESPIRATION RATE: 20 BRPM | OXYGEN SATURATION: 93 % | BODY MASS INDEX: 25.6 KG/M2 | DIASTOLIC BLOOD PRESSURE: 51 MMHG | HEIGHT: 59 IN | SYSTOLIC BLOOD PRESSURE: 164 MMHG | HEART RATE: 66 BPM | TEMPERATURE: 97.5 F

## 2019-01-01 VITALS
HEART RATE: 76 BPM | BODY MASS INDEX: 25.95 KG/M2 | WEIGHT: 128.7 LBS | OXYGEN SATURATION: 97 % | SYSTOLIC BLOOD PRESSURE: 124 MMHG | DIASTOLIC BLOOD PRESSURE: 70 MMHG | SYSTOLIC BLOOD PRESSURE: 118 MMHG | WEIGHT: 123.4 LBS | DIASTOLIC BLOOD PRESSURE: 68 MMHG | BODY MASS INDEX: 24.92 KG/M2 | HEIGHT: 59 IN | HEART RATE: 64 BPM | RESPIRATION RATE: 20 BRPM

## 2019-01-01 VITALS
DIASTOLIC BLOOD PRESSURE: 52 MMHG | WEIGHT: 129 LBS | SYSTOLIC BLOOD PRESSURE: 130 MMHG | HEART RATE: 64 BPM | HEIGHT: 59 IN | BODY MASS INDEX: 26 KG/M2

## 2019-01-01 VITALS
SYSTOLIC BLOOD PRESSURE: 178 MMHG | OXYGEN SATURATION: 92 % | HEART RATE: 62 BPM | DIASTOLIC BLOOD PRESSURE: 64 MMHG | BODY MASS INDEX: 25.65 KG/M2 | WEIGHT: 127 LBS

## 2019-01-01 VITALS
SYSTOLIC BLOOD PRESSURE: 138 MMHG | RESPIRATION RATE: 18 BRPM | OXYGEN SATURATION: 98 % | HEART RATE: 65 BPM | DIASTOLIC BLOOD PRESSURE: 78 MMHG

## 2019-01-01 VITALS
SYSTOLIC BLOOD PRESSURE: 148 MMHG | HEART RATE: 62 BPM | OXYGEN SATURATION: 92 % | HEIGHT: 59 IN | BODY MASS INDEX: 25.6 KG/M2 | DIASTOLIC BLOOD PRESSURE: 62 MMHG | WEIGHT: 127 LBS

## 2019-01-01 VITALS
SYSTOLIC BLOOD PRESSURE: 124 MMHG | DIASTOLIC BLOOD PRESSURE: 70 MMHG | OXYGEN SATURATION: 92 % | WEIGHT: 129.6 LBS | BODY MASS INDEX: 26.16 KG/M2 | HEART RATE: 62 BPM

## 2019-01-01 DIAGNOSIS — I10 ESSENTIAL HYPERTENSION: Primary | ICD-10-CM

## 2019-01-01 DIAGNOSIS — I51.89 GRADE II DIASTOLIC DYSFUNCTION: ICD-10-CM

## 2019-01-01 DIAGNOSIS — I10 ESSENTIAL HYPERTENSION: ICD-10-CM

## 2019-01-01 DIAGNOSIS — E03.9 HYPOTHYROIDISM, UNSPECIFIED TYPE: ICD-10-CM

## 2019-01-01 DIAGNOSIS — Z09 HOSPITAL DISCHARGE FOLLOW-UP: ICD-10-CM

## 2019-01-01 DIAGNOSIS — E87.1 HYPONATREMIA: ICD-10-CM

## 2019-01-01 DIAGNOSIS — R30.0 DYSURIA: Primary | ICD-10-CM

## 2019-01-01 DIAGNOSIS — R60.9 PERIPHERAL EDEMA: ICD-10-CM

## 2019-01-01 DIAGNOSIS — R73.01 IFG (IMPAIRED FASTING GLUCOSE): ICD-10-CM

## 2019-01-01 DIAGNOSIS — K59.00 CONSTIPATION, UNSPECIFIED CONSTIPATION TYPE: ICD-10-CM

## 2019-01-01 DIAGNOSIS — M54.50 CHRONIC MIDLINE LOW BACK PAIN WITHOUT SCIATICA: ICD-10-CM

## 2019-01-01 DIAGNOSIS — E78.5 HYPERLIPIDEMIA, UNSPECIFIED HYPERLIPIDEMIA TYPE: Primary | ICD-10-CM

## 2019-01-01 DIAGNOSIS — E87.1 HYPONATREMIA: Primary | ICD-10-CM

## 2019-01-01 DIAGNOSIS — R30.0 DYSURIA: ICD-10-CM

## 2019-01-01 DIAGNOSIS — I71.40 ABDOMINAL AORTIC ANEURYSM (AAA) 30 TO 34 MM IN DIAMETER: ICD-10-CM

## 2019-01-01 DIAGNOSIS — R10.13 ABDOMINAL PAIN, EPIGASTRIC: ICD-10-CM

## 2019-01-01 DIAGNOSIS — E87.6 HYPOKALEMIA: ICD-10-CM

## 2019-01-01 DIAGNOSIS — M54.50 MIDLINE LOW BACK PAIN WITHOUT SCIATICA, UNSPECIFIED CHRONICITY: ICD-10-CM

## 2019-01-01 DIAGNOSIS — R73.01 IFG (IMPAIRED FASTING GLUCOSE): Primary | ICD-10-CM

## 2019-01-01 DIAGNOSIS — R11.2 NON-INTRACTABLE VOMITING WITH NAUSEA, UNSPECIFIED VOMITING TYPE: Primary | ICD-10-CM

## 2019-01-01 DIAGNOSIS — R11.0 NAUSEA: ICD-10-CM

## 2019-01-01 DIAGNOSIS — G89.29 CHRONIC MIDLINE LOW BACK PAIN WITHOUT SCIATICA: ICD-10-CM

## 2019-01-01 DIAGNOSIS — K59.00 CONSTIPATION, UNSPECIFIED CONSTIPATION TYPE: Primary | ICD-10-CM

## 2019-01-01 LAB
-: ABNORMAL
ABSOLUTE EOS #: 0 K/UL (ref 0–0.4)
ABSOLUTE EOS #: 0.1 K/UL (ref 0–0.4)
ABSOLUTE IMMATURE GRANULOCYTE: ABNORMAL K/UL (ref 0–0.3)
ABSOLUTE IMMATURE GRANULOCYTE: ABNORMAL K/UL (ref 0–0.3)
ABSOLUTE LYMPH #: 1.1 K/UL (ref 1–4.8)
ABSOLUTE LYMPH #: 1.6 K/UL (ref 1–4.8)
ABSOLUTE MONO #: 0.8 K/UL (ref 0.1–1.2)
ABSOLUTE MONO #: 1 K/UL (ref 0.1–1.2)
ALBUMIN SERPL-MCNC: 3.8 G/DL (ref 3.5–5.2)
ALBUMIN SERPL-MCNC: 4.3 G/DL (ref 3.5–5.2)
ALBUMIN/GLOBULIN RATIO: 1.4 (ref 1–2.5)
ALBUMIN/GLOBULIN RATIO: 1.5 (ref 1–2.5)
ALP BLD-CCNC: 103 U/L (ref 35–104)
ALP BLD-CCNC: 86 U/L (ref 35–104)
ALT SERPL-CCNC: 10 U/L (ref 5–33)
ALT SERPL-CCNC: 11 U/L (ref 5–33)
AMORPHOUS: ABNORMAL
AMYLASE: 48 U/L (ref 28–100)
ANION GAP SERPL CALCULATED.3IONS-SCNC: 10 MMOL/L (ref 9–17)
ANION GAP SERPL CALCULATED.3IONS-SCNC: 10 MMOL/L (ref 9–17)
ANION GAP SERPL CALCULATED.3IONS-SCNC: 11 MMOL/L (ref 9–17)
ANION GAP SERPL CALCULATED.3IONS-SCNC: 11 MMOL/L (ref 9–17)
ANION GAP SERPL CALCULATED.3IONS-SCNC: 14 MMOL/L (ref 9–17)
ANION GAP SERPL CALCULATED.3IONS-SCNC: 5 MMOL/L (ref 9–17)
ANION GAP SERPL CALCULATED.3IONS-SCNC: 8 MMOL/L (ref 9–17)
ANION GAP SERPL CALCULATED.3IONS-SCNC: 9 MMOL/L (ref 9–17)
AST SERPL-CCNC: 14 U/L
AST SERPL-CCNC: 18 U/L
BACTERIA: ABNORMAL
BASOPHILS # BLD: 0 % (ref 0–1)
BASOPHILS # BLD: 1 % (ref 0–1)
BASOPHILS ABSOLUTE: 0 K/UL (ref 0–0.2)
BASOPHILS ABSOLUTE: 0 K/UL (ref 0–0.2)
BILIRUB SERPL-MCNC: 1.09 MG/DL (ref 0.3–1.2)
BILIRUB SERPL-MCNC: 1.34 MG/DL (ref 0.3–1.2)
BILIRUBIN DIRECT: 0.31 MG/DL
BILIRUBIN URINE: NEGATIVE
BILIRUBIN, INDIRECT: 1.03 MG/DL (ref 0–1)
BNP INTERPRETATION: ABNORMAL
BUN BLDV-MCNC: 12 MG/DL (ref 8–23)
BUN BLDV-MCNC: 13 MG/DL (ref 8–23)
BUN BLDV-MCNC: 16 MG/DL (ref 8–23)
BUN BLDV-MCNC: 16 MG/DL (ref 8–23)
BUN BLDV-MCNC: 18 MG/DL (ref 8–23)
BUN BLDV-MCNC: 18 MG/DL (ref 8–23)
BUN BLDV-MCNC: 19 MG/DL (ref 8–23)
BUN BLDV-MCNC: 6 MG/DL (ref 8–23)
BUN BLDV-MCNC: 8 MG/DL (ref 8–23)
BUN BLDV-MCNC: 9 MG/DL (ref 8–23)
BUN/CREAT BLD: 13 (ref 9–20)
BUN/CREAT BLD: 27 (ref 9–20)
BUN/CREAT BLD: 29 (ref 9–20)
BUN/CREAT BLD: 32 (ref 9–20)
BUN/CREAT BLD: 34 (ref 9–20)
BUN/CREAT BLD: 36 (ref 9–20)
BUN/CREAT BLD: 37 (ref 9–20)
BUN/CREAT BLD: 37 (ref 9–20)
BUN/CREAT BLD: 40 (ref 9–20)
BUN/CREAT BLD: 42 (ref 9–20)
BUN/CREAT BLD: ABNORMAL (ref 9–20)
BUN/CREAT BLD: ABNORMAL (ref 9–20)
CALCIUM SERPL-MCNC: 8.2 MG/DL (ref 8.6–10.4)
CALCIUM SERPL-MCNC: 8.4 MG/DL (ref 8.6–10.4)
CALCIUM SERPL-MCNC: 8.6 MG/DL (ref 8.6–10.4)
CALCIUM SERPL-MCNC: 8.6 MG/DL (ref 8.6–10.4)
CALCIUM SERPL-MCNC: 9 MG/DL (ref 8.6–10.4)
CALCIUM SERPL-MCNC: 9.1 MG/DL (ref 8.6–10.4)
CALCIUM SERPL-MCNC: 9.2 MG/DL (ref 8.6–10.4)
CALCIUM SERPL-MCNC: 9.3 MG/DL (ref 8.6–10.4)
CALCIUM SERPL-MCNC: 9.3 MG/DL (ref 8.6–10.4)
CALCIUM SERPL-MCNC: 9.6 MG/DL (ref 8.6–10.4)
CASTS UA: ABNORMAL /LPF (ref 0–2)
CHLORIDE BLD-SCNC: 100 MMOL/L (ref 98–107)
CHLORIDE BLD-SCNC: 72 MMOL/L (ref 98–107)
CHLORIDE BLD-SCNC: 85 MMOL/L (ref 98–107)
CHLORIDE BLD-SCNC: 88 MMOL/L (ref 98–107)
CHLORIDE BLD-SCNC: 88 MMOL/L (ref 98–107)
CHLORIDE BLD-SCNC: 92 MMOL/L (ref 98–107)
CHLORIDE BLD-SCNC: 93 MMOL/L (ref 98–107)
CHLORIDE BLD-SCNC: 94 MMOL/L (ref 98–107)
CHLORIDE BLD-SCNC: 94 MMOL/L (ref 98–107)
CHLORIDE BLD-SCNC: 95 MMOL/L (ref 98–107)
CHLORIDE BLD-SCNC: 95 MMOL/L (ref 98–107)
CHLORIDE BLD-SCNC: 96 MMOL/L (ref 98–107)
CHOLESTEROL/HDL RATIO: 3.5
CHOLESTEROL: 198 MG/DL
CO2: 28 MMOL/L (ref 20–31)
CO2: 28 MMOL/L (ref 20–31)
CO2: 30 MMOL/L (ref 20–31)
CO2: 31 MMOL/L (ref 20–31)
CO2: 32 MMOL/L (ref 20–31)
CO2: 32 MMOL/L (ref 20–31)
COLOR: ABNORMAL
COMMENT UA: ABNORMAL
CREAT SERPL-MCNC: 0.41 MG/DL (ref 0.5–0.9)
CREAT SERPL-MCNC: 0.45 MG/DL (ref 0.5–0.9)
CREAT SERPL-MCNC: 0.47 MG/DL (ref 0.5–0.9)
CREAT SERPL-MCNC: 0.49 MG/DL (ref 0.5–0.9)
CREAT SERPL-MCNC: 0.5 MG/DL (ref 0.5–0.9)
CREAT SERPL-MCNC: 0.5 MG/DL (ref 0.5–0.9)
CREAT SERPL-MCNC: 0.52 MG/DL (ref 0.5–0.9)
CREAT SERPL-MCNC: 0.52 MG/DL (ref 0.5–0.9)
CREAT SERPL-MCNC: <0.4 MG/DL (ref 0.5–0.9)
CREAT SERPL-MCNC: <0.4 MG/DL (ref 0.5–0.9)
CRYSTALS, UA: ABNORMAL /HPF
CULTURE: ABNORMAL
DIFFERENTIAL TYPE: ABNORMAL
DIFFERENTIAL TYPE: ABNORMAL
EKG ATRIAL RATE: 65 BPM
EKG ATRIAL RATE: 66 BPM
EKG ATRIAL RATE: 72 BPM
EKG P AXIS: 58 DEGREES
EKG P AXIS: 60 DEGREES
EKG P AXIS: 60 DEGREES
EKG P-R INTERVAL: 192 MS
EKG P-R INTERVAL: 202 MS
EKG P-R INTERVAL: 208 MS
EKG Q-T INTERVAL: 436 MS
EKG Q-T INTERVAL: 462 MS
EKG Q-T INTERVAL: 506 MS
EKG QRS DURATION: 136 MS
EKG QRS DURATION: 136 MS
EKG QRS DURATION: 156 MS
EKG QTC CALCULATION (BAZETT): 477 MS
EKG QTC CALCULATION (BAZETT): 484 MS
EKG QTC CALCULATION (BAZETT): 526 MS
EKG R AXIS: -15 DEGREES
EKG R AXIS: -5 DEGREES
EKG T AXIS: -176 DEGREES
EKG T AXIS: 173 DEGREES
EKG T AXIS: 174 DEGREES
EKG VENTRICULAR RATE: 65 BPM
EKG VENTRICULAR RATE: 66 BPM
EKG VENTRICULAR RATE: 72 BPM
EOSINOPHILS RELATIVE PERCENT: 0 % (ref 1–7)
EOSINOPHILS RELATIVE PERCENT: 2 % (ref 1–7)
EPITHELIAL CELLS UA: ABNORMAL /HPF (ref 0–5)
ESTIMATED AVERAGE GLUCOSE: 117 MG/DL
GFR AFRICAN AMERICAN: >60 ML/MIN
GFR AFRICAN AMERICAN: ABNORMAL ML/MIN
GFR AFRICAN AMERICAN: ABNORMAL ML/MIN
GFR NON-AFRICAN AMERICAN: >60 ML/MIN
GFR NON-AFRICAN AMERICAN: ABNORMAL ML/MIN
GFR NON-AFRICAN AMERICAN: ABNORMAL ML/MIN
GFR SERPL CREATININE-BSD FRML MDRD: ABNORMAL ML/MIN/{1.73_M2}
GLOBULIN: 2.8 G/DL (ref 1.5–3.8)
GLUCOSE BLD-MCNC: 103 MG/DL (ref 70–99)
GLUCOSE BLD-MCNC: 106 MG/DL (ref 70–99)
GLUCOSE BLD-MCNC: 107 MG/DL (ref 70–99)
GLUCOSE BLD-MCNC: 112 MG/DL (ref 70–99)
GLUCOSE BLD-MCNC: 117 MG/DL (ref 70–99)
GLUCOSE BLD-MCNC: 121 MG/DL (ref 70–99)
GLUCOSE BLD-MCNC: 125 MG/DL (ref 70–99)
GLUCOSE BLD-MCNC: 126 MG/DL (ref 70–99)
GLUCOSE BLD-MCNC: 128 MG/DL (ref 70–99)
GLUCOSE BLD-MCNC: 128 MG/DL (ref 70–99)
GLUCOSE BLD-MCNC: 134 MG/DL (ref 70–99)
GLUCOSE BLD-MCNC: 134 MG/DL (ref 70–99)
GLUCOSE URINE: NEGATIVE
HBA1C MFR BLD: 5.7 % (ref 4.8–5.9)
HCT VFR BLD CALC: 32.9 % (ref 36–46)
HCT VFR BLD CALC: 33.2 % (ref 36–46)
HCT VFR BLD CALC: 35.9 % (ref 36–46)
HCT VFR BLD CALC: 37.2 % (ref 36–46)
HCT VFR BLD CALC: 41 % (ref 36–46)
HDLC SERPL-MCNC: 56 MG/DL
HEMOGLOBIN: 11.4 G/DL (ref 12–16)
HEMOGLOBIN: 11.6 G/DL (ref 12–16)
HEMOGLOBIN: 12.8 G/DL (ref 12–16)
HEMOGLOBIN: 12.9 G/DL (ref 12–16)
HEMOGLOBIN: 13.8 G/DL (ref 12–16)
IMMATURE GRANULOCYTES: ABNORMAL %
IMMATURE GRANULOCYTES: ABNORMAL %
KETONES, URINE: ABNORMAL
KETONES, URINE: NEGATIVE
KETONES, URINE: NEGATIVE
LACTIC ACID: 0.9 MMOL/L (ref 0.5–2.2)
LDL CHOLESTEROL: 122 MG/DL (ref 0–130)
LEUKOCYTE ESTERASE, URINE: ABNORMAL
LEUKOCYTE ESTERASE, URINE: NEGATIVE
LEUKOCYTE ESTERASE, URINE: NEGATIVE
LIPASE: 26 U/L (ref 13–60)
LV EF: 50 %
LVEF MODALITY: NORMAL
LYMPHOCYTES # BLD: 18 % (ref 16–46)
LYMPHOCYTES # BLD: 23 % (ref 16–46)
Lab: ABNORMAL
MAGNESIUM: 1.9 MG/DL (ref 1.6–2.6)
MCH RBC QN AUTO: 31.5 PG (ref 26–34)
MCH RBC QN AUTO: 31.5 PG (ref 26–34)
MCH RBC QN AUTO: 31.6 PG (ref 26–34)
MCH RBC QN AUTO: 31.7 PG (ref 26–34)
MCH RBC QN AUTO: 31.8 PG (ref 26–34)
MCHC RBC AUTO-ENTMCNC: 33.7 G/DL (ref 31–37)
MCHC RBC AUTO-ENTMCNC: 34.4 G/DL (ref 31–37)
MCHC RBC AUTO-ENTMCNC: 34.4 G/DL (ref 31–37)
MCHC RBC AUTO-ENTMCNC: 35.2 G/DL (ref 31–37)
MCHC RBC AUTO-ENTMCNC: 36 G/DL (ref 31–37)
MCV RBC AUTO: 87.9 FL (ref 80–100)
MCV RBC AUTO: 89.8 FL (ref 80–100)
MCV RBC AUTO: 91.8 FL (ref 80–100)
MCV RBC AUTO: 92.4 FL (ref 80–100)
MCV RBC AUTO: 93.4 FL (ref 80–100)
MONOCYTES # BLD: 12 % (ref 4–11)
MONOCYTES # BLD: 14 % (ref 4–11)
MUCUS: ABNORMAL
NITRITE, URINE: NEGATIVE
NRBC AUTOMATED: ABNORMAL PER 100 WBC
NRBC AUTOMATED: NORMAL PER 100 WBC
NRBC AUTOMATED: NORMAL PER 100 WBC
OTHER OBSERVATIONS UA: ABNORMAL
PDW BLD-RTO: 13.2 % (ref 11–14.5)
PDW BLD-RTO: 13.2 % (ref 11–14.5)
PDW BLD-RTO: 13.3 % (ref 11–14.5)
PDW BLD-RTO: 13.4 % (ref 11–14.5)
PDW BLD-RTO: 13.5 % (ref 11–14.5)
PH UA: 7 (ref 5–6)
PH UA: 7 (ref 5–6)
PH UA: 7.5 (ref 5–6)
PLATELET # BLD: 169 K/UL (ref 140–450)
PLATELET # BLD: 173 K/UL (ref 140–450)
PLATELET # BLD: 175 K/UL (ref 140–450)
PLATELET # BLD: 182 K/UL (ref 140–450)
PLATELET # BLD: 225 K/UL (ref 140–450)
PLATELET ESTIMATE: ABNORMAL
PLATELET ESTIMATE: ABNORMAL
PMV BLD AUTO: 7.2 FL (ref 6–12)
PMV BLD AUTO: 7.5 FL (ref 6–12)
PMV BLD AUTO: 7.7 FL (ref 6–12)
PMV BLD AUTO: 7.8 FL (ref 6–12)
PMV BLD AUTO: 8.4 FL (ref 6–12)
POTASSIUM SERPL-SCNC: 2.9 MMOL/L (ref 3.7–5.3)
POTASSIUM SERPL-SCNC: 3.7 MMOL/L (ref 3.7–5.3)
POTASSIUM SERPL-SCNC: 3.8 MMOL/L (ref 3.7–5.3)
POTASSIUM SERPL-SCNC: 3.9 MMOL/L (ref 3.7–5.3)
POTASSIUM SERPL-SCNC: 3.9 MMOL/L (ref 3.7–5.3)
POTASSIUM SERPL-SCNC: 4 MMOL/L (ref 3.7–5.3)
POTASSIUM SERPL-SCNC: 4.1 MMOL/L (ref 3.7–5.3)
POTASSIUM SERPL-SCNC: 4.1 MMOL/L (ref 3.7–5.3)
POTASSIUM SERPL-SCNC: 4.2 MMOL/L (ref 3.7–5.3)
POTASSIUM SERPL-SCNC: 4.4 MMOL/L (ref 3.7–5.3)
POTASSIUM SERPL-SCNC: 4.4 MMOL/L (ref 3.7–5.3)
POTASSIUM SERPL-SCNC: 4.8 MMOL/L (ref 3.7–5.3)
POTASSIUM SERPL-SCNC: 4.8 MMOL/L (ref 3.7–5.3)
PRO-BNP: 2413 PG/ML
PROTEIN UA: NEGATIVE
RBC # BLD: 3.59 M/UL (ref 4–5.2)
RBC # BLD: 3.66 M/UL (ref 4–5.2)
RBC # BLD: 4.05 M/UL (ref 4–5.2)
RBC # BLD: 4.09 M/UL (ref 4–5.2)
RBC # BLD: 4.39 M/UL (ref 4–5.2)
RBC # BLD: ABNORMAL 10*6/UL
RBC # BLD: ABNORMAL 10*6/UL
RBC UA: ABNORMAL /HPF (ref 0–4)
RENAL EPITHELIAL, UA: ABNORMAL /HPF
SEG NEUTROPHILS: 61 % (ref 43–77)
SEG NEUTROPHILS: 69 % (ref 43–77)
SEGMENTED NEUTROPHILS ABSOLUTE COUNT: 4.3 K/UL (ref 1.8–7.7)
SEGMENTED NEUTROPHILS ABSOLUTE COUNT: 4.4 K/UL (ref 1.8–7.7)
SODIUM BLD-SCNC: 114 MMOL/L (ref 135–144)
SODIUM BLD-SCNC: 125 MMOL/L (ref 135–144)
SODIUM BLD-SCNC: 125 MMOL/L (ref 135–144)
SODIUM BLD-SCNC: 129 MMOL/L (ref 135–144)
SODIUM BLD-SCNC: 130 MMOL/L (ref 135–144)
SODIUM BLD-SCNC: 133 MMOL/L (ref 135–144)
SODIUM BLD-SCNC: 134 MMOL/L (ref 135–144)
SODIUM BLD-SCNC: 137 MMOL/L (ref 135–144)
SODIUM BLD-SCNC: 137 MMOL/L (ref 135–144)
SODIUM BLD-SCNC: 139 MMOL/L (ref 135–144)
SPECIFIC GRAVITY UA: 1.01 (ref 1.01–1.02)
SPECIMEN DESCRIPTION: ABNORMAL
TOTAL PROTEIN: 6.6 G/DL (ref 6.4–8.3)
TOTAL PROTEIN: 7.2 G/DL (ref 6.4–8.3)
TRICHOMONAS: ABNORMAL
TRIGL SERPL-MCNC: 98 MG/DL
TROPONIN INTERP: ABNORMAL
TROPONIN T: ABNORMAL NG/ML
TROPONIN, HIGH SENSITIVITY: 18 NG/L (ref 0–14)
TROPONIN, HIGH SENSITIVITY: 23 NG/L (ref 0–14)
TROPONIN, HIGH SENSITIVITY: 25 NG/L (ref 0–14)
TROPONIN, HIGH SENSITIVITY: 25 NG/L (ref 0–14)
TSH SERPL DL<=0.05 MIU/L-ACNC: 3.32 MIU/L (ref 0.3–5)
TSH SERPL DL<=0.05 MIU/L-ACNC: 4.62 MIU/L (ref 0.3–5)
TURBIDITY: ABNORMAL
URINE HGB: NEGATIVE
UROBILINOGEN, URINE: NORMAL
VLDLC SERPL CALC-MCNC: NORMAL MG/DL (ref 1–30)
WBC # BLD: 5.3 K/UL (ref 3.5–11)
WBC # BLD: 6 K/UL (ref 3.5–11)
WBC # BLD: 6.2 K/UL (ref 3.5–11)
WBC # BLD: 6.5 K/UL (ref 3.5–11)
WBC # BLD: 7.1 K/UL (ref 3.5–11)
WBC # BLD: ABNORMAL 10*3/UL
WBC # BLD: ABNORMAL 10*3/UL
WBC UA: ABNORMAL /HPF (ref 0–4)
YEAST: ABNORMAL

## 2019-01-01 PROCEDURE — 6360000002 HC RX W HCPCS: Performed by: INTERNAL MEDICINE

## 2019-01-01 PROCEDURE — G8427 DOCREV CUR MEDS BY ELIG CLIN: HCPCS | Performed by: FAMILY MEDICINE

## 2019-01-01 PROCEDURE — 94760 N-INVAS EAR/PLS OXIMETRY 1: CPT

## 2019-01-01 PROCEDURE — 1036F TOBACCO NON-USER: CPT | Performed by: SURGERY

## 2019-01-01 PROCEDURE — 81001 URINALYSIS AUTO W/SCOPE: CPT

## 2019-01-01 PROCEDURE — 36415 COLL VENOUS BLD VENIPUNCTURE: CPT

## 2019-01-01 PROCEDURE — 1090F PRES/ABSN URINE INCON ASSESS: CPT | Performed by: SURGERY

## 2019-01-01 PROCEDURE — 85027 COMPLETE CBC AUTOMATED: CPT

## 2019-01-01 PROCEDURE — 1123F ACP DISCUSS/DSCN MKR DOCD: CPT | Performed by: FAMILY MEDICINE

## 2019-01-01 PROCEDURE — G8484 FLU IMMUNIZE NO ADMIN: HCPCS | Performed by: FAMILY MEDICINE

## 2019-01-01 PROCEDURE — 2700000000 HC OXYGEN THERAPY PER DAY

## 2019-01-01 PROCEDURE — 80048 BASIC METABOLIC PNL TOTAL CA: CPT

## 2019-01-01 PROCEDURE — 99213 OFFICE O/P EST LOW 20 MIN: CPT | Performed by: FAMILY MEDICINE

## 2019-01-01 PROCEDURE — 4040F PNEUMOC VAC/ADMIN/RCVD: CPT | Performed by: FAMILY MEDICINE

## 2019-01-01 PROCEDURE — 80076 HEPATIC FUNCTION PANEL: CPT

## 2019-01-01 PROCEDURE — 93005 ELECTROCARDIOGRAM TRACING: CPT | Performed by: INTERNAL MEDICINE

## 2019-01-01 PROCEDURE — 1200000000 HC SEMI PRIVATE

## 2019-01-01 PROCEDURE — 6370000000 HC RX 637 (ALT 250 FOR IP)

## 2019-01-01 PROCEDURE — G8427 DOCREV CUR MEDS BY ELIG CLIN: HCPCS | Performed by: INTERNAL MEDICINE

## 2019-01-01 PROCEDURE — G8417 CALC BMI ABV UP PARAM F/U: HCPCS | Performed by: FAMILY MEDICINE

## 2019-01-01 PROCEDURE — 84132 ASSAY OF SERUM POTASSIUM: CPT

## 2019-01-01 PROCEDURE — 96375 TX/PRO/DX INJ NEW DRUG ADDON: CPT

## 2019-01-01 PROCEDURE — 93306 TTE W/DOPPLER COMPLETE: CPT

## 2019-01-01 PROCEDURE — G8484 FLU IMMUNIZE NO ADMIN: HCPCS | Performed by: SURGERY

## 2019-01-01 PROCEDURE — 1111F DSCHRG MED/CURRENT MED MERGE: CPT | Performed by: FAMILY MEDICINE

## 2019-01-01 PROCEDURE — 99214 OFFICE O/P EST MOD 30 MIN: CPT | Performed by: FAMILY MEDICINE

## 2019-01-01 PROCEDURE — 83036 HEMOGLOBIN GLYCOSYLATED A1C: CPT

## 2019-01-01 PROCEDURE — 99203 OFFICE O/P NEW LOW 30 MIN: CPT | Performed by: SURGERY

## 2019-01-01 PROCEDURE — 83690 ASSAY OF LIPASE: CPT

## 2019-01-01 PROCEDURE — 1090F PRES/ABSN URINE INCON ASSESS: CPT | Performed by: FAMILY MEDICINE

## 2019-01-01 PROCEDURE — 80061 LIPID PANEL: CPT

## 2019-01-01 PROCEDURE — 6370000000 HC RX 637 (ALT 250 FOR IP): Performed by: INTERNAL MEDICINE

## 2019-01-01 PROCEDURE — 84443 ASSAY THYROID STIM HORMONE: CPT

## 2019-01-01 PROCEDURE — 1036F TOBACCO NON-USER: CPT | Performed by: FAMILY MEDICINE

## 2019-01-01 PROCEDURE — 94150 VITAL CAPACITY TEST: CPT

## 2019-01-01 PROCEDURE — 6370000000 HC RX 637 (ALT 250 FOR IP): Performed by: NURSE PRACTITIONER

## 2019-01-01 PROCEDURE — 99238 HOSP IP/OBS DSCHRG MGMT 30/<: CPT | Performed by: INTERNAL MEDICINE

## 2019-01-01 PROCEDURE — 93005 ELECTROCARDIOGRAM TRACING: CPT | Performed by: EMERGENCY MEDICINE

## 2019-01-01 PROCEDURE — G8417 CALC BMI ABV UP PARAM F/U: HCPCS | Performed by: SURGERY

## 2019-01-01 PROCEDURE — 94669 MECHANICAL CHEST WALL OSCILL: CPT

## 2019-01-01 PROCEDURE — 2580000003 HC RX 258: Performed by: INTERNAL MEDICINE

## 2019-01-01 PROCEDURE — 80053 COMPREHEN METABOLIC PANEL: CPT

## 2019-01-01 PROCEDURE — 1123F ACP DISCUSS/DSCN MKR DOCD: CPT | Performed by: INTERNAL MEDICINE

## 2019-01-01 PROCEDURE — 99222 1ST HOSP IP/OBS MODERATE 55: CPT | Performed by: INTERNAL MEDICINE

## 2019-01-01 PROCEDURE — 96365 THER/PROPH/DIAG IV INF INIT: CPT

## 2019-01-01 PROCEDURE — 85025 COMPLETE CBC W/AUTO DIFF WBC: CPT

## 2019-01-01 PROCEDURE — 83880 ASSAY OF NATRIURETIC PEPTIDE: CPT

## 2019-01-01 PROCEDURE — 4040F PNEUMOC VAC/ADMIN/RCVD: CPT | Performed by: SURGERY

## 2019-01-01 PROCEDURE — 94664 DEMO&/EVAL PT USE INHALER: CPT

## 2019-01-01 PROCEDURE — 87077 CULTURE AEROBIC IDENTIFY: CPT

## 2019-01-01 PROCEDURE — 6360000004 HC RX CONTRAST MEDICATION: Performed by: EMERGENCY MEDICINE

## 2019-01-01 PROCEDURE — G8484 FLU IMMUNIZE NO ADMIN: HCPCS | Performed by: INTERNAL MEDICINE

## 2019-01-01 PROCEDURE — 99214 OFFICE O/P EST MOD 30 MIN: CPT | Performed by: INTERNAL MEDICINE

## 2019-01-01 PROCEDURE — 99495 TRANSJ CARE MGMT MOD F2F 14D: CPT | Performed by: FAMILY MEDICINE

## 2019-01-01 PROCEDURE — 87086 URINE CULTURE/COLONY COUNT: CPT

## 2019-01-01 PROCEDURE — 1090F PRES/ABSN URINE INCON ASSESS: CPT | Performed by: INTERNAL MEDICINE

## 2019-01-01 PROCEDURE — G8420 CALC BMI NORM PARAMETERS: HCPCS | Performed by: FAMILY MEDICINE

## 2019-01-01 PROCEDURE — 83605 ASSAY OF LACTIC ACID: CPT

## 2019-01-01 PROCEDURE — 99285 EMERGENCY DEPT VISIT HI MDM: CPT

## 2019-01-01 PROCEDURE — 1111F DSCHRG MED/CURRENT MED MERGE: CPT | Performed by: INTERNAL MEDICINE

## 2019-01-01 PROCEDURE — 1111F DSCHRG MED/CURRENT MED MERGE: CPT | Performed by: SURGERY

## 2019-01-01 PROCEDURE — 6360000002 HC RX W HCPCS: Performed by: EMERGENCY MEDICINE

## 2019-01-01 PROCEDURE — 83735 ASSAY OF MAGNESIUM: CPT

## 2019-01-01 PROCEDURE — 82150 ASSAY OF AMYLASE: CPT

## 2019-01-01 PROCEDURE — 84484 ASSAY OF TROPONIN QUANT: CPT

## 2019-01-01 PROCEDURE — 99232 SBSQ HOSP IP/OBS MODERATE 35: CPT | Performed by: INTERNAL MEDICINE

## 2019-01-01 PROCEDURE — 74177 CT ABD & PELVIS W/CONTRAST: CPT

## 2019-01-01 PROCEDURE — 87186 SC STD MICRODIL/AGAR DIL: CPT

## 2019-01-01 PROCEDURE — 99223 1ST HOSP IP/OBS HIGH 75: CPT | Performed by: INTERNAL MEDICINE

## 2019-01-01 PROCEDURE — 4040F PNEUMOC VAC/ADMIN/RCVD: CPT | Performed by: INTERNAL MEDICINE

## 2019-01-01 PROCEDURE — G8417 CALC BMI ABV UP PARAM F/U: HCPCS | Performed by: INTERNAL MEDICINE

## 2019-01-01 PROCEDURE — 1036F TOBACCO NON-USER: CPT | Performed by: INTERNAL MEDICINE

## 2019-01-01 PROCEDURE — 1123F ACP DISCUSS/DSCN MKR DOCD: CPT | Performed by: SURGERY

## 2019-01-01 PROCEDURE — G8427 DOCREV CUR MEDS BY ELIG CLIN: HCPCS | Performed by: SURGERY

## 2019-01-01 RX ORDER — SODIUM CHLORIDE 1000 MG
1 TABLET, SOLUBLE MISCELLANEOUS
Status: DISCONTINUED | OUTPATIENT
Start: 2019-01-01 | End: 2019-01-01 | Stop reason: HOSPADM

## 2019-01-01 RX ORDER — POTASSIUM CHLORIDE AND SODIUM CHLORIDE 900; 300 MG/100ML; MG/100ML
INJECTION, SOLUTION INTRAVENOUS CONTINUOUS
Status: DISCONTINUED | OUTPATIENT
Start: 2019-01-01 | End: 2019-01-01

## 2019-01-01 RX ORDER — CARVEDILOL 12.5 MG/1
12.5 TABLET ORAL 2 TIMES DAILY WITH MEALS
COMMUNITY

## 2019-01-01 RX ORDER — HYDROCHLOROTHIAZIDE 50 MG/1
50 TABLET ORAL EVERY MORNING
Qty: 30 TABLET | Refills: 2 | Status: SHIPPED | OUTPATIENT
Start: 2019-01-01 | End: 2019-01-01 | Stop reason: SINTOL

## 2019-01-01 RX ORDER — CLONIDINE HYDROCHLORIDE 0.1 MG/1
0.1 TABLET ORAL 2 TIMES DAILY
Qty: 180 TABLET | Refills: 1 | Status: SHIPPED | OUTPATIENT
Start: 2019-01-01

## 2019-01-01 RX ORDER — SODIUM CHLORIDE 0.9 % (FLUSH) 0.9 %
10 SYRINGE (ML) INJECTION EVERY 12 HOURS SCHEDULED
Status: DISCONTINUED | OUTPATIENT
Start: 2019-01-01 | End: 2019-01-01 | Stop reason: HOSPADM

## 2019-01-01 RX ORDER — LACTOBACILLUS RHAMNOSUS GG 10B CELL
1 CAPSULE ORAL DAILY
Status: DISCONTINUED | OUTPATIENT
Start: 2019-01-01 | End: 2019-01-01 | Stop reason: HOSPADM

## 2019-01-01 RX ORDER — ALBUTEROL SULFATE 2.5 MG/3ML
2.5 SOLUTION RESPIRATORY (INHALATION)
Status: DISCONTINUED | OUTPATIENT
Start: 2019-01-01 | End: 2019-01-01 | Stop reason: HOSPADM

## 2019-01-01 RX ORDER — LACTULOSE 10 G/15ML
20 SOLUTION ORAL 2 TIMES DAILY
Qty: 1 BOTTLE | Refills: 0 | Status: SHIPPED | OUTPATIENT
Start: 2019-01-01 | End: 2019-01-01 | Stop reason: ALTCHOICE

## 2019-01-01 RX ORDER — ONDANSETRON 2 MG/ML
4 INJECTION INTRAMUSCULAR; INTRAVENOUS ONCE
Status: COMPLETED | OUTPATIENT
Start: 2019-01-01 | End: 2019-01-01

## 2019-01-01 RX ORDER — ALBUTEROL SULFATE 2.5 MG/3ML
2.5 SOLUTION RESPIRATORY (INHALATION)
Status: DISCONTINUED | OUTPATIENT
Start: 2019-01-01 | End: 2019-01-01 | Stop reason: SDUPTHER

## 2019-01-01 RX ORDER — PSEUDOEPHEDRINE HCL 30 MG
100 TABLET ORAL 2 TIMES DAILY
Status: ON HOLD | COMMUNITY
Start: 2019-01-01 | End: 2020-01-01 | Stop reason: HOSPADM

## 2019-01-01 RX ORDER — SODIUM CHLORIDE 0.9 % (FLUSH) 0.9 %
10 SYRINGE (ML) INJECTION PRN
Status: DISCONTINUED | OUTPATIENT
Start: 2019-01-01 | End: 2019-01-01 | Stop reason: HOSPADM

## 2019-01-01 RX ORDER — CLONIDINE HYDROCHLORIDE 0.1 MG/1
0.05 TABLET ORAL 2 TIMES DAILY PRN
COMMUNITY
End: 2019-01-01 | Stop reason: DRUGHIGH

## 2019-01-01 RX ORDER — BENAZEPRIL HYDROCHLORIDE 40 MG/1
40 TABLET, FILM COATED ORAL DAILY
Qty: 90 TABLET | Refills: 2 | Status: SHIPPED | OUTPATIENT
Start: 2019-01-01 | End: 2019-01-01 | Stop reason: SDUPTHER

## 2019-01-01 RX ORDER — DOCUSATE SODIUM 100 MG/1
100 CAPSULE, LIQUID FILLED ORAL 2 TIMES DAILY
Status: DISCONTINUED | OUTPATIENT
Start: 2019-01-01 | End: 2019-01-01 | Stop reason: HOSPADM

## 2019-01-01 RX ORDER — ASCORBIC ACID 500 MG
500 TABLET ORAL DAILY
Status: DISCONTINUED | OUTPATIENT
Start: 2019-01-01 | End: 2019-01-01 | Stop reason: HOSPADM

## 2019-01-01 RX ORDER — ANTIOX #8/OM3/DHA/EPA/LUT/ZEAX 250-2.5 MG
1 CAPSULE ORAL 2 TIMES DAILY
Status: DISCONTINUED | OUTPATIENT
Start: 2019-01-01 | End: 2019-01-01 | Stop reason: HOSPADM

## 2019-01-01 RX ORDER — LIDOCAINE 4 G/G
1 PATCH TOPICAL DAILY
Status: DISCONTINUED | OUTPATIENT
Start: 2019-01-01 | End: 2019-01-01 | Stop reason: HOSPADM

## 2019-01-01 RX ORDER — POTASSIUM CHLORIDE 750 MG/1
10 TABLET, FILM COATED, EXTENDED RELEASE ORAL 3 TIMES DAILY
COMMUNITY

## 2019-01-01 RX ORDER — ONDANSETRON 2 MG/ML
8 INJECTION INTRAMUSCULAR; INTRAVENOUS EVERY 4 HOURS PRN
Status: DISCONTINUED | OUTPATIENT
Start: 2019-01-01 | End: 2019-01-01 | Stop reason: HOSPADM

## 2019-01-01 RX ORDER — LANOLIN ALCOHOL/MO/W.PET/CERES
400 CREAM (GRAM) TOPICAL DAILY
Status: DISCONTINUED | OUTPATIENT
Start: 2019-01-01 | End: 2019-01-01 | Stop reason: HOSPADM

## 2019-01-01 RX ORDER — FUROSEMIDE 20 MG/1
10 TABLET ORAL DAILY
COMMUNITY

## 2019-01-01 RX ORDER — LACTULOSE 10 G/15ML
20 SOLUTION ORAL 2 TIMES DAILY
Status: DISCONTINUED | OUTPATIENT
Start: 2019-01-01 | End: 2019-01-01 | Stop reason: HOSPADM

## 2019-01-01 RX ORDER — FUROSEMIDE 20 MG/1
20 TABLET ORAL DAILY
Qty: 60 TABLET | Refills: 3 | Status: SHIPPED | OUTPATIENT
Start: 2019-01-01 | End: 2019-01-01 | Stop reason: ALTCHOICE

## 2019-01-01 RX ORDER — HEPARIN SODIUM 5000 [USP'U]/ML
5000 INJECTION, SOLUTION INTRAVENOUS; SUBCUTANEOUS EVERY 8 HOURS SCHEDULED
Status: DISCONTINUED | OUTPATIENT
Start: 2019-01-01 | End: 2019-01-01 | Stop reason: HOSPADM

## 2019-01-01 RX ORDER — PANTOPRAZOLE SODIUM 40 MG/1
40 TABLET, DELAYED RELEASE ORAL
Status: DISCONTINUED | OUTPATIENT
Start: 2019-01-01 | End: 2019-01-01 | Stop reason: HOSPADM

## 2019-01-01 RX ORDER — OMEPRAZOLE 40 MG/1
CAPSULE, DELAYED RELEASE ORAL
Qty: 30 CAPSULE | Refills: 3 | Status: SHIPPED | OUTPATIENT
Start: 2019-01-01 | End: 2019-01-01 | Stop reason: SDUPTHER

## 2019-01-01 RX ORDER — CARVEDILOL 3.12 MG/1
3.12 TABLET ORAL 2 TIMES DAILY WITH MEALS
Status: DISCONTINUED | OUTPATIENT
Start: 2019-01-01 | End: 2019-01-01 | Stop reason: HOSPADM

## 2019-01-01 RX ORDER — CARVEDILOL 3.12 MG/1
3.12 TABLET ORAL 2 TIMES DAILY WITH MEALS
Qty: 60 TABLET | Refills: 0 | Status: SHIPPED | OUTPATIENT
Start: 2019-01-01 | End: 2019-01-01

## 2019-01-01 RX ORDER — CLONIDINE HYDROCHLORIDE 0.1 MG/1
0.1 TABLET ORAL DAILY
Status: DISCONTINUED | OUTPATIENT
Start: 2019-01-01 | End: 2019-01-01 | Stop reason: HOSPADM

## 2019-01-01 RX ORDER — CARVEDILOL 12.5 MG/1
TABLET ORAL
Qty: 180 TABLET | Refills: 1 | Status: ON HOLD | OUTPATIENT
Start: 2019-01-01 | End: 2019-01-01 | Stop reason: HOSPADM

## 2019-01-01 RX ORDER — CARBOXYMETHYLCELLULOSE SODIUM 10 MG/ML
1 GEL OPHTHALMIC PRN
Status: DISCONTINUED | OUTPATIENT
Start: 2019-01-01 | End: 2019-01-01 | Stop reason: HOSPADM

## 2019-01-01 RX ORDER — VITAMIN B COMPLEX
100 TABLET ORAL 2 TIMES DAILY
Status: DISCONTINUED | OUTPATIENT
Start: 2019-01-01 | End: 2019-01-01 | Stop reason: HOSPADM

## 2019-01-01 RX ORDER — LEVOTHYROXINE SODIUM 0.07 MG/1
TABLET ORAL
Qty: 90 TABLET | Refills: 2 | Status: SHIPPED | OUTPATIENT
Start: 2019-01-01

## 2019-01-01 RX ORDER — HYDROCHLOROTHIAZIDE 50 MG/1
50 TABLET ORAL DAILY
Qty: 90 TABLET | Refills: 2 | Status: SHIPPED | OUTPATIENT
Start: 2019-01-01 | End: 2019-01-01 | Stop reason: SDUPTHER

## 2019-01-01 RX ORDER — SELENIUM 50 MCG
1 TABLET ORAL DAILY
Qty: 90 CAPSULE | Refills: 1 | Status: SHIPPED | OUTPATIENT
Start: 2019-01-01 | End: 2020-01-01

## 2019-01-01 RX ORDER — LUTEIN 10 MG
1 TABLET ORAL DAILY
Status: DISCONTINUED | OUTPATIENT
Start: 2019-01-01 | End: 2019-01-01 | Stop reason: HOSPADM

## 2019-01-01 RX ORDER — HYDROCHLOROTHIAZIDE 25 MG/1
50 TABLET ORAL DAILY
Status: DISCONTINUED | OUTPATIENT
Start: 2019-01-01 | End: 2019-01-01 | Stop reason: HOSPADM

## 2019-01-01 RX ORDER — VIT A/VIT C/VIT E/ZINC/COPPER 2148-113
TABLET ORAL
Status: ON HOLD | COMMUNITY
End: 2020-01-01 | Stop reason: HOSPADM

## 2019-01-01 RX ORDER — OMEPRAZOLE 40 MG/1
CAPSULE, DELAYED RELEASE ORAL
Qty: 30 CAPSULE | Refills: 3 | Status: SHIPPED | OUTPATIENT
Start: 2019-01-01 | End: 2020-01-01

## 2019-01-01 RX ORDER — SODIUM CHLORIDE 1000 MG
1 TABLET, SOLUBLE MISCELLANEOUS
Qty: 90 TABLET | Refills: 0 | Status: SHIPPED | OUTPATIENT
Start: 2019-01-01 | End: 2019-01-01 | Stop reason: ALTCHOICE

## 2019-01-01 RX ORDER — DOCUSATE SODIUM 100 MG/1
100 CAPSULE, LIQUID FILLED ORAL DAILY
Status: DISCONTINUED | OUTPATIENT
Start: 2019-01-01 | End: 2019-01-01

## 2019-01-01 RX ORDER — POTASSIUM CHLORIDE 750 MG/1
TABLET, EXTENDED RELEASE ORAL
Qty: 540 TABLET | Refills: 1 | Status: SHIPPED | OUTPATIENT
Start: 2019-01-01 | End: 2019-01-01 | Stop reason: DRUGHIGH

## 2019-01-01 RX ORDER — CARVEDILOL 3.12 MG/1
3.12 TABLET ORAL 2 TIMES DAILY WITH MEALS
Qty: 60 TABLET | Refills: 0
Start: 2019-01-01 | End: 2019-01-01 | Stop reason: ALTCHOICE

## 2019-01-01 RX ORDER — LEVOTHYROXINE SODIUM 0.07 MG/1
75 TABLET ORAL DAILY
Status: DISCONTINUED | OUTPATIENT
Start: 2019-01-01 | End: 2019-01-01 | Stop reason: HOSPADM

## 2019-01-01 RX ORDER — CARVEDILOL 12.5 MG/1
TABLET ORAL
Qty: 180 TABLET | Refills: 2 | Status: SHIPPED | OUTPATIENT
Start: 2019-01-01 | End: 2019-01-01 | Stop reason: SDUPTHER

## 2019-01-01 RX ORDER — MAGNESIUM 200 MG
200 TABLET ORAL DAILY
Status: DISCONTINUED | OUTPATIENT
Start: 2019-01-01 | End: 2019-01-01 | Stop reason: RX

## 2019-01-01 RX ORDER — ONDANSETRON 4 MG/1
4 TABLET, FILM COATED ORAL DAILY PRN
Qty: 30 TABLET | Refills: 0 | Status: SHIPPED | OUTPATIENT
Start: 2019-01-01 | End: 2019-01-01 | Stop reason: ALTCHOICE

## 2019-01-01 RX ORDER — SODIUM CHLORIDE 9 MG/ML
INJECTION, SOLUTION INTRAVENOUS CONTINUOUS
Status: DISCONTINUED | OUTPATIENT
Start: 2019-01-01 | End: 2019-01-01

## 2019-01-01 RX ORDER — SELENIUM 50 MCG
1 TABLET ORAL DAILY
Qty: 90 CAPSULE | Refills: 1 | Status: SHIPPED | OUTPATIENT
Start: 2019-01-01 | End: 2019-01-01 | Stop reason: SDUPTHER

## 2019-01-01 RX ORDER — AMLODIPINE BESYLATE 2.5 MG/1
2.5 TABLET ORAL DAILY
Qty: 90 TABLET | Refills: 1 | Status: SHIPPED | OUTPATIENT
Start: 2019-01-01

## 2019-01-01 RX ORDER — ACETAMINOPHEN 325 MG/1
650 TABLET ORAL EVERY 4 HOURS PRN
Status: DISCONTINUED | OUTPATIENT
Start: 2019-01-01 | End: 2019-01-01 | Stop reason: HOSPADM

## 2019-01-01 RX ORDER — SODIUM CHLORIDE 9 MG/ML
INJECTION, SOLUTION INTRAVENOUS CONTINUOUS
Status: DISCONTINUED | OUTPATIENT
Start: 2019-01-01 | End: 2019-01-01 | Stop reason: HOSPADM

## 2019-01-01 RX ORDER — POTASSIUM CHLORIDE 750 MG/1
TABLET, EXTENDED RELEASE ORAL
Qty: 540 TABLET | Refills: 1 | Status: SHIPPED | OUTPATIENT
Start: 2019-01-01 | End: 2019-01-01 | Stop reason: ALTCHOICE

## 2019-01-01 RX ORDER — CLONIDINE HYDROCHLORIDE 0.1 MG/1
0.1 TABLET ORAL DAILY
Qty: 90 TABLET | Refills: 2 | Status: SHIPPED | OUTPATIENT
Start: 2019-01-01 | End: 2019-01-01 | Stop reason: SDUPTHER

## 2019-01-01 RX ORDER — LISINOPRIL 20 MG/1
40 TABLET ORAL DAILY
Status: DISCONTINUED | OUTPATIENT
Start: 2019-01-01 | End: 2019-01-01 | Stop reason: HOSPADM

## 2019-01-01 RX ORDER — ASPIRIN 81 MG/1
81 TABLET ORAL DAILY
Status: DISCONTINUED | OUTPATIENT
Start: 2019-01-01 | End: 2019-01-01 | Stop reason: HOSPADM

## 2019-01-01 RX ORDER — CLONIDINE HYDROCHLORIDE 0.1 MG/1
0.1 TABLET ORAL 2 TIMES DAILY
Qty: 180 TABLET | Refills: 1 | Status: SHIPPED | COMMUNITY
Start: 2019-01-01 | End: 2019-01-01 | Stop reason: SDUPTHER

## 2019-01-01 RX ORDER — HYDROCHLOROTHIAZIDE 50 MG/1
50 TABLET ORAL DAILY
Qty: 90 TABLET | Refills: 1 | Status: ON HOLD | OUTPATIENT
Start: 2019-01-01 | End: 2019-01-01 | Stop reason: HOSPADM

## 2019-01-01 RX ORDER — CLONIDINE HYDROCHLORIDE 0.1 MG/1
0.1 TABLET ORAL DAILY
Qty: 90 TABLET | Refills: 1 | Status: SHIPPED | OUTPATIENT
Start: 2019-01-01 | End: 2019-01-01 | Stop reason: HOSPADM

## 2019-01-01 RX ORDER — LUTEIN 10 MG
1 TABLET ORAL DAILY
COMMUNITY

## 2019-01-01 RX ORDER — SODIUM CHLORIDE FOR INHALATION 0.9 %
3 VIAL, NEBULIZER (ML) INHALATION
Status: DISCONTINUED | OUTPATIENT
Start: 2019-01-01 | End: 2019-01-01 | Stop reason: ALTCHOICE

## 2019-01-01 RX ORDER — MULTIVITAMIN WITH FOLIC ACID 400 MCG
TABLET ORAL
Status: COMPLETED
Start: 2019-01-01 | End: 2019-01-01

## 2019-01-01 RX ORDER — BENAZEPRIL HYDROCHLORIDE 40 MG/1
40 TABLET, FILM COATED ORAL DAILY
Qty: 90 TABLET | Refills: 1 | Status: SHIPPED | OUTPATIENT
Start: 2019-01-01

## 2019-01-01 RX ORDER — LEVOTHYROXINE SODIUM 0.07 MG/1
TABLET ORAL
Qty: 90 TABLET | Refills: 2 | Status: SHIPPED | OUTPATIENT
Start: 2019-01-01 | End: 2019-01-01 | Stop reason: SDUPTHER

## 2019-01-01 RX ORDER — SELENIUM 50 MCG
1 TABLET ORAL DAILY
Qty: 90 CAPSULE | Refills: 2 | Status: SHIPPED | OUTPATIENT
Start: 2019-01-01 | End: 2019-01-01 | Stop reason: SDUPTHER

## 2019-01-01 RX ORDER — MULTIVITAMIN WITH FOLIC ACID 400 MCG
1 TABLET ORAL DAILY
Status: DISCONTINUED | OUTPATIENT
Start: 2019-01-01 | End: 2019-01-01 | Stop reason: HOSPADM

## 2019-01-01 RX ADMIN — HEPARIN SODIUM 5000 UNITS: 5000 INJECTION INTRAVENOUS; SUBCUTANEOUS at 14:50

## 2019-01-01 RX ADMIN — DOCUSATE SODIUM 100 MG: 100 CAPSULE, LIQUID FILLED ORAL at 08:11

## 2019-01-01 RX ADMIN — Medication 1 CAPSULE: at 08:31

## 2019-01-01 RX ADMIN — HYDROCHLOROTHIAZIDE 50 MG: 25 TABLET ORAL at 08:27

## 2019-01-01 RX ADMIN — PSYLLIUM HUSK 1 PACKET: 3.4 POWDER ORAL at 12:21

## 2019-01-01 RX ADMIN — CARVEDILOL 3.12 MG: 3.12 TABLET, FILM COATED ORAL at 08:12

## 2019-01-01 RX ADMIN — HYDROCHLOROTHIAZIDE 50 MG: 25 TABLET ORAL at 17:28

## 2019-01-01 RX ADMIN — Medication 400 MG: at 08:27

## 2019-01-01 RX ADMIN — CARVEDILOL 3.12 MG: 3.12 TABLET, FILM COATED ORAL at 08:27

## 2019-01-01 RX ADMIN — LACTULOSE 20 G: 20 SOLUTION ORAL at 08:28

## 2019-01-01 RX ADMIN — POTASSIUM CHLORIDE 30 MEQ: 20 TABLET, EXTENDED RELEASE ORAL at 21:30

## 2019-01-01 RX ADMIN — SODIUM CHLORIDE TAB 1 GM 1 G: 1 TAB at 12:21

## 2019-01-01 RX ADMIN — Medication 1 CAPSULE: at 08:11

## 2019-01-01 RX ADMIN — IOPAMIDOL 75 ML: 755 INJECTION, SOLUTION INTRAVENOUS at 11:14

## 2019-01-01 RX ADMIN — DOCUSATE SODIUM 100 MG: 100 CAPSULE, LIQUID FILLED ORAL at 21:30

## 2019-01-01 RX ADMIN — HEPARIN SODIUM 5000 UNITS: 5000 INJECTION INTRAVENOUS; SUBCUTANEOUS at 21:30

## 2019-01-01 RX ADMIN — LEVOTHYROXINE SODIUM 75 MCG: 75 TABLET ORAL at 06:27

## 2019-01-01 RX ADMIN — ASPIRIN 81 MG: 81 TABLET, COATED ORAL at 17:27

## 2019-01-01 RX ADMIN — HEPARIN SODIUM 5000 UNITS: 5000 INJECTION INTRAVENOUS; SUBCUTANEOUS at 22:30

## 2019-01-01 RX ADMIN — Medication 1 CAPSULE: at 08:27

## 2019-01-01 RX ADMIN — LEVOTHYROXINE SODIUM 75 MCG: 75 TABLET ORAL at 05:44

## 2019-01-01 RX ADMIN — Medication 1 CAPSULE: at 22:31

## 2019-01-01 RX ADMIN — ASPIRIN 81 MG: 81 TABLET, COATED ORAL at 08:27

## 2019-01-01 RX ADMIN — SODIUM CHLORIDE TAB 1 GM 1 G: 1 TAB at 17:27

## 2019-01-01 RX ADMIN — PANTOPRAZOLE SODIUM 40 MG: 40 TABLET, DELAYED RELEASE ORAL at 17:11

## 2019-01-01 RX ADMIN — OXYCODONE HYDROCHLORIDE AND ACETAMINOPHEN 500 MG: 500 TABLET ORAL at 17:27

## 2019-01-01 RX ADMIN — SODIUM CHLORIDE TAB 1 GM 1 G: 1 TAB at 08:27

## 2019-01-01 RX ADMIN — ASPIRIN 81 MG: 81 TABLET, COATED ORAL at 08:11

## 2019-01-01 RX ADMIN — HEPARIN SODIUM 5000 UNITS: 5000 INJECTION INTRAVENOUS; SUBCUTANEOUS at 06:27

## 2019-01-01 RX ADMIN — DOCUSATE SODIUM 100 MG: 100 CAPSULE, LIQUID FILLED ORAL at 08:28

## 2019-01-01 RX ADMIN — Medication 1 CAPSULE: at 17:27

## 2019-01-01 RX ADMIN — Medication 1 CAPSULE: at 21:36

## 2019-01-01 RX ADMIN — POTASSIUM CHLORIDE 30 MEQ: 20 TABLET, EXTENDED RELEASE ORAL at 08:23

## 2019-01-01 RX ADMIN — CARBOXYMETHYLCELLULOSE SODIUM 1 DROP: 10 GEL OPHTHALMIC at 21:37

## 2019-01-01 RX ADMIN — ONDANSETRON 8 MG: 2 INJECTION INTRAMUSCULAR; INTRAVENOUS at 15:59

## 2019-01-01 RX ADMIN — Medication 1 TABLET: at 08:34

## 2019-01-01 RX ADMIN — OXYCODONE HYDROCHLORIDE AND ACETAMINOPHEN 500 MG: 500 TABLET ORAL at 08:28

## 2019-01-01 RX ADMIN — PANTOPRAZOLE SODIUM 40 MG: 40 TABLET, DELAYED RELEASE ORAL at 05:43

## 2019-01-01 RX ADMIN — POTASSIUM CHLORIDE AND SODIUM CHLORIDE: 900; 300 INJECTION, SOLUTION INTRAVENOUS at 11:50

## 2019-01-01 RX ADMIN — CLONIDINE HYDROCHLORIDE 0.1 MG: 0.1 TABLET ORAL at 08:12

## 2019-01-01 RX ADMIN — PANTOPRAZOLE SODIUM 40 MG: 40 TABLET, DELAYED RELEASE ORAL at 06:27

## 2019-01-01 RX ADMIN — SODIUM CHLORIDE TAB 1 GM 1 G: 1 TAB at 08:13

## 2019-01-01 RX ADMIN — HEPARIN SODIUM 5000 UNITS: 5000 INJECTION INTRAVENOUS; SUBCUTANEOUS at 14:20

## 2019-01-01 RX ADMIN — LACTULOSE 20 G: 20 SOLUTION ORAL at 12:20

## 2019-01-01 RX ADMIN — CARVEDILOL 3.12 MG: 3.12 TABLET, FILM COATED ORAL at 17:11

## 2019-01-01 RX ADMIN — THERA TABS 1 TABLET: TAB at 08:34

## 2019-01-01 RX ADMIN — Medication 400 MG: at 17:27

## 2019-01-01 RX ADMIN — PSYLLIUM HUSK 1 PACKET: 3.4 POWDER ORAL at 08:27

## 2019-01-01 RX ADMIN — POTASSIUM BICARBONATE 40 MEQ: 782 TABLET, EFFERVESCENT ORAL at 14:20

## 2019-01-01 RX ADMIN — SODIUM CHLORIDE: 9 INJECTION, SOLUTION INTRAVENOUS at 08:22

## 2019-01-01 RX ADMIN — POTASSIUM CHLORIDE 30 MEQ: 20 TABLET, EXTENDED RELEASE ORAL at 22:29

## 2019-01-01 RX ADMIN — ONDANSETRON 4 MG: 2 INJECTION INTRAMUSCULAR; INTRAVENOUS at 10:30

## 2019-01-01 RX ADMIN — LISINOPRIL 40 MG: 20 TABLET ORAL at 08:27

## 2019-01-01 RX ADMIN — PANTOPRAZOLE SODIUM 40 MG: 40 TABLET, DELAYED RELEASE ORAL at 17:27

## 2019-01-01 RX ADMIN — Medication 400 MG: at 08:11

## 2019-01-01 RX ADMIN — LISINOPRIL 40 MG: 20 TABLET ORAL at 08:10

## 2019-01-01 RX ADMIN — HYDROCHLOROTHIAZIDE 50 MG: 25 TABLET ORAL at 08:11

## 2019-01-01 RX ADMIN — SODIUM CHLORIDE TAB 1 GM 1 G: 1 TAB at 17:11

## 2019-01-01 RX ADMIN — SODIUM CHLORIDE: 9 INJECTION, SOLUTION INTRAVENOUS at 04:10

## 2019-01-01 RX ADMIN — POTASSIUM CHLORIDE 30 MEQ: 20 TABLET, EXTENDED RELEASE ORAL at 08:27

## 2019-01-01 RX ADMIN — SODIUM CHLORIDE TAB 1 GM 1 G: 1 TAB at 14:20

## 2019-01-01 RX ADMIN — CLONIDINE HYDROCHLORIDE 0.1 MG: 0.1 TABLET ORAL at 08:27

## 2019-01-01 RX ADMIN — SODIUM CHLORIDE: 9 INJECTION, SOLUTION INTRAVENOUS at 18:31

## 2019-01-01 RX ADMIN — OXYCODONE HYDROCHLORIDE AND ACETAMINOPHEN 500 MG: 500 TABLET ORAL at 08:12

## 2019-01-01 RX ADMIN — HEPARIN SODIUM 5000 UNITS: 5000 INJECTION INTRAVENOUS; SUBCUTANEOUS at 05:43

## 2019-01-01 RX ADMIN — SODIUM CHLORIDE TAB 1 GM 1 G: 1 TAB at 12:31

## 2019-01-01 ASSESSMENT — PAIN SCALES - GENERAL
PAINLEVEL_OUTOF10: 0
PAINLEVEL_OUTOF10: 0
PAINLEVEL_OUTOF10: 2
PAINLEVEL_OUTOF10: 0
PAINLEVEL_OUTOF10: 0

## 2019-01-01 ASSESSMENT — ENCOUNTER SYMPTOMS
ALLERGIC/IMMUNOLOGIC NEGATIVE: 1
EYES NEGATIVE: 1
GASTROINTESTINAL NEGATIVE: 1
EYES NEGATIVE: 1
RESPIRATORY NEGATIVE: 1
ALLERGIC/IMMUNOLOGIC NEGATIVE: 1
GASTROINTESTINAL NEGATIVE: 1
RESPIRATORY NEGATIVE: 1

## 2019-01-01 ASSESSMENT — PATIENT HEALTH QUESTIONNAIRE - PHQ9
SUM OF ALL RESPONSES TO PHQ9 QUESTIONS 1 & 2: 0
SUM OF ALL RESPONSES TO PHQ QUESTIONS 1-9: 0
SUM OF ALL RESPONSES TO PHQ QUESTIONS 1-9: 0
SUM OF ALL RESPONSES TO PHQ9 QUESTIONS 1 & 2: 0
SUM OF ALL RESPONSES TO PHQ QUESTIONS 1-9: 0
2. FEELING DOWN, DEPRESSED OR HOPELESS: 0
2. FEELING DOWN, DEPRESSED OR HOPELESS: 0
1. LITTLE INTEREST OR PLEASURE IN DOING THINGS: 0
SUM OF ALL RESPONSES TO PHQ QUESTIONS 1-9: 0
1. LITTLE INTEREST OR PLEASURE IN DOING THINGS: 0

## 2019-01-01 ASSESSMENT — PAIN DESCRIPTION - LOCATION: LOCATION: ABDOMEN

## 2019-01-01 ASSESSMENT — PAIN DESCRIPTION - PAIN TYPE: TYPE: ACUTE PAIN

## 2019-01-01 ASSESSMENT — PAIN DESCRIPTION - ORIENTATION: ORIENTATION: UPPER;MID

## 2019-01-25 DIAGNOSIS — I10 ESSENTIAL HYPERTENSION: ICD-10-CM

## 2019-01-25 DIAGNOSIS — E03.9 HYPOTHYROIDISM, UNSPECIFIED TYPE: ICD-10-CM

## 2019-01-25 RX ORDER — HYDROCHLOROTHIAZIDE 50 MG/1
50 TABLET ORAL DAILY
Qty: 90 TABLET | Refills: 2 | OUTPATIENT
Start: 2019-01-25 | End: 2019-01-01 | Stop reason: SDUPTHER

## 2019-01-25 RX ORDER — LEVOTHYROXINE SODIUM 0.07 MG/1
TABLET ORAL
Qty: 90 TABLET | Refills: 2 | OUTPATIENT
Start: 2019-01-25 | End: 2019-01-01 | Stop reason: SDUPTHER

## 2019-01-25 RX ORDER — BENAZEPRIL HYDROCHLORIDE 40 MG/1
40 TABLET, FILM COATED ORAL DAILY
Qty: 90 TABLET | Refills: 2 | OUTPATIENT
Start: 2019-01-25 | End: 2019-01-01 | Stop reason: SDUPTHER

## 2019-01-25 RX ORDER — SELENIUM 50 MCG
1 TABLET ORAL DAILY
Qty: 90 CAPSULE | Refills: 2 | OUTPATIENT
Start: 2019-01-25 | End: 2019-01-01 | Stop reason: SDUPTHER

## 2019-01-25 RX ORDER — POTASSIUM CHLORIDE 750 MG/1
TABLET, EXTENDED RELEASE ORAL
Qty: 540 TABLET | Refills: 2 | OUTPATIENT
Start: 2019-01-25 | End: 2019-01-01 | Stop reason: SDUPTHER

## 2019-01-25 RX ORDER — CARVEDILOL 12.5 MG/1
TABLET ORAL
Qty: 180 TABLET | Refills: 2 | OUTPATIENT
Start: 2019-01-25 | End: 2019-01-01 | Stop reason: SDUPTHER

## 2019-02-25 ENCOUNTER — OFFICE VISIT (OUTPATIENT)
Dept: FAMILY MEDICINE CLINIC | Age: 84
End: 2019-02-25
Payer: MEDICARE

## 2019-02-25 VITALS
WEIGHT: 125 LBS | DIASTOLIC BLOOD PRESSURE: 62 MMHG | HEIGHT: 59 IN | OXYGEN SATURATION: 95 % | SYSTOLIC BLOOD PRESSURE: 158 MMHG | BODY MASS INDEX: 25.2 KG/M2 | HEART RATE: 68 BPM

## 2019-02-25 DIAGNOSIS — E78.1 HYPERTRIGLYCERIDEMIA: ICD-10-CM

## 2019-02-25 DIAGNOSIS — H91.90 HEARING LOSS, UNSPECIFIED HEARING LOSS TYPE, UNSPECIFIED LATERALITY: ICD-10-CM

## 2019-02-25 DIAGNOSIS — I10 ESSENTIAL HYPERTENSION: Primary | ICD-10-CM

## 2019-02-25 DIAGNOSIS — E03.9 HYPOTHYROIDISM, UNSPECIFIED TYPE: ICD-10-CM

## 2019-02-25 PROCEDURE — 1090F PRES/ABSN URINE INCON ASSESS: CPT | Performed by: FAMILY MEDICINE

## 2019-02-25 PROCEDURE — 4040F PNEUMOC VAC/ADMIN/RCVD: CPT | Performed by: FAMILY MEDICINE

## 2019-02-25 PROCEDURE — 1036F TOBACCO NON-USER: CPT | Performed by: FAMILY MEDICINE

## 2019-02-25 PROCEDURE — 1123F ACP DISCUSS/DSCN MKR DOCD: CPT | Performed by: FAMILY MEDICINE

## 2019-02-25 PROCEDURE — G8484 FLU IMMUNIZE NO ADMIN: HCPCS | Performed by: FAMILY MEDICINE

## 2019-02-25 PROCEDURE — 99214 OFFICE O/P EST MOD 30 MIN: CPT | Performed by: FAMILY MEDICINE

## 2019-02-25 PROCEDURE — G8417 CALC BMI ABV UP PARAM F/U: HCPCS | Performed by: FAMILY MEDICINE

## 2019-02-25 PROCEDURE — G8427 DOCREV CUR MEDS BY ELIG CLIN: HCPCS | Performed by: FAMILY MEDICINE

## 2019-02-25 PROCEDURE — 1101F PT FALLS ASSESS-DOCD LE1/YR: CPT | Performed by: FAMILY MEDICINE

## 2019-02-25 RX ORDER — CLONIDINE HYDROCHLORIDE 0.1 MG/1
0.1 TABLET ORAL DAILY
Qty: 30 TABLET | Refills: 5 | Status: SHIPPED | OUTPATIENT
Start: 2019-02-25 | End: 2019-01-01 | Stop reason: SDUPTHER

## 2019-02-28 ENCOUNTER — TELEPHONE (OUTPATIENT)
Dept: FAMILY MEDICINE CLINIC | Age: 84
End: 2019-02-28

## 2019-11-20 PROBLEM — E87.1 HYPONATREMIA: Status: ACTIVE | Noted: 2019-01-01

## 2019-11-22 NOTE — TELEPHONE ENCOUNTER
[facility] Initial Follow Up Call    Outreach made within 2 business days of discharge: Yes    Patient: Corey Angel Patient : 10/26/1928   MRN: [id number]  Reason for Admission: hypoatremia  Discharge Date: 19       Spoke with: Asha Martin    Discharge department/facility: [facility]    TCM Interactive Patient Contact:  Was patient able to fill all prescriptions: Yes  Was patient instructed to bring all medications to the follow-up visit: Yes  Is patient taking all medications as directed in the discharge summary?  Yes  Does patient understand their discharge instructions: Yes  Does patient have questions or concerns that need addressed prior to 7-14 day follow up office visit: no    Scheduled appointment with PCP within 7-14 days    Follow Up  Future Appointments   Date Time Provider Paola Zayas   2019 10:40 AM MD JAYCE Abdi DP   2019  9:30 AM MD PAGE Naylor [facility]   2019  8:45 AM DO PRESTON Pierson [facility]       Jamil Norris [facility]

## 2019-12-04 PROBLEM — I71.40 ABDOMINAL AORTIC ANEURYSM (AAA) 30 TO 34 MM IN DIAMETER: Status: ACTIVE | Noted: 2019-01-01

## 2020-01-01 ENCOUNTER — PATIENT MESSAGE (OUTPATIENT)
Dept: FAMILY MEDICINE CLINIC | Age: 85
End: 2020-01-01

## 2020-01-01 ENCOUNTER — OFFICE VISIT (OUTPATIENT)
Dept: PRIMARY CARE CLINIC | Age: 85
End: 2020-01-01
Payer: MEDICARE

## 2020-01-01 ENCOUNTER — HOSPITAL ENCOUNTER (OUTPATIENT)
Dept: LAB | Age: 85
Discharge: HOME | End: 2020-02-20
Payer: MEDICARE

## 2020-01-01 ENCOUNTER — HOSPITAL ENCOUNTER (OUTPATIENT)
Dept: LAB | Age: 85
Discharge: HOME | End: 2020-01-27
Payer: MEDICARE

## 2020-01-01 ENCOUNTER — APPOINTMENT (OUTPATIENT)
Dept: GENERAL RADIOLOGY | Age: 85
DRG: 298 | End: 2020-01-01
Payer: MEDICARE

## 2020-01-01 ENCOUNTER — HOSPITAL ENCOUNTER (OUTPATIENT)
Dept: LAB | Age: 85
Discharge: HOME | End: 2020-03-17
Payer: MEDICARE

## 2020-01-01 ENCOUNTER — HOSPITAL ENCOUNTER (OUTPATIENT)
Age: 85
Setting detail: SPECIMEN
Discharge: HOME | End: 2020-01-10
Payer: MEDICARE

## 2020-01-01 ENCOUNTER — HOSPITAL ENCOUNTER (INPATIENT)
Age: 85
LOS: 1 days | Discharge: HOSPICE | DRG: 298 | End: 2020-05-16
Attending: EMERGENCY MEDICINE | Admitting: FAMILY MEDICINE
Payer: MEDICARE

## 2020-01-01 ENCOUNTER — HOSPITAL ENCOUNTER (OUTPATIENT)
Dept: LAB | Age: 85
Discharge: HOME | End: 2020-01-10
Payer: MEDICARE

## 2020-01-01 ENCOUNTER — HOSPITAL ENCOUNTER (OUTPATIENT)
Age: 85
Setting detail: SPECIMEN
Discharge: HOME | End: 2020-01-22
Payer: MEDICARE

## 2020-01-01 ENCOUNTER — HOSPITAL ENCOUNTER (OUTPATIENT)
Dept: LAB | Age: 85
Discharge: HOME | End: 2020-02-25
Payer: MEDICARE

## 2020-01-01 VITALS
TEMPERATURE: 97.5 F | SYSTOLIC BLOOD PRESSURE: 115 MMHG | HEART RATE: 81 BPM | RESPIRATION RATE: 30 BRPM | WEIGHT: 136.4 LBS | HEIGHT: 62 IN | OXYGEN SATURATION: 92 % | BODY MASS INDEX: 25.1 KG/M2 | DIASTOLIC BLOOD PRESSURE: 54 MMHG

## 2020-01-01 VITALS
OXYGEN SATURATION: 92 % | DIASTOLIC BLOOD PRESSURE: 68 MMHG | HEIGHT: 59 IN | RESPIRATION RATE: 16 BRPM | WEIGHT: 125.6 LBS | SYSTOLIC BLOOD PRESSURE: 128 MMHG | BODY MASS INDEX: 25.32 KG/M2 | HEART RATE: 76 BPM | TEMPERATURE: 98.7 F

## 2020-01-01 LAB
-: ABNORMAL
ABSOLUTE EOS #: 0.11 K/UL (ref 0–0.44)
ABSOLUTE IMMATURE GRANULOCYTE: 0.15 K/UL (ref 0–0.3)
ABSOLUTE LYMPH #: 2.97 K/UL (ref 1.1–3.7)
ABSOLUTE MONO #: 0.6 K/UL (ref 0.1–1.2)
ALBUMIN SERPL-MCNC: 4 G/DL (ref 3.5–5.2)
ALBUMIN/GLOBULIN RATIO: 1.5 (ref 1–2.5)
ALP BLD-CCNC: 147 U/L (ref 35–104)
ALT SERPL-CCNC: 16 U/L (ref 5–33)
AMORPHOUS: ABNORMAL
AMYLASE: 59 U/L (ref 28–100)
ANION GAP SERPL CALCULATED.3IONS-SCNC: 10 MMOL/L (ref 9–17)
ANION GAP SERPL CALCULATED.3IONS-SCNC: 11 MMOL/L (ref 9–17)
ANION GAP SERPL CALCULATED.3IONS-SCNC: 14 MMOL/L (ref 9–17)
AST SERPL-CCNC: 21 U/L
BACTERIA: ABNORMAL
BASOPHILS # BLD: 0 % (ref 0–2)
BASOPHILS ABSOLUTE: 0.03 K/UL (ref 0–0.2)
BILIRUB SERPL-MCNC: 0.68 MG/DL (ref 0.3–1.2)
BILIRUBIN DIRECT: 0.17 MG/DL
BILIRUBIN URINE: NEGATIVE
BILIRUBIN, INDIRECT: 0.51 MG/DL (ref 0–1)
BUN BLDV-MCNC: 15 MG/DL (ref 8–23)
BUN BLDV-MCNC: 15 MG/DL (ref 8–23)
BUN BLDV-MCNC: 18 MG/DL (ref 8–23)
BUN BLDV-MCNC: 18 MG/DL (ref 8–23)
BUN BLDV-MCNC: 20 MG/DL (ref 8–23)
BUN BLDV-MCNC: 21 MG/DL (ref 8–23)
BUN BLDV-MCNC: 35 MG/DL (ref 8–23)
BUN/CREAT BLD: 30 (ref 9–20)
BUN/CREAT BLD: 31 (ref 9–20)
BUN/CREAT BLD: 33 (ref 9–20)
BUN/CREAT BLD: 34 (ref 9–20)
BUN/CREAT BLD: 39 (ref 9–20)
BUN/CREAT BLD: 42 (ref 9–20)
BUN/CREAT BLD: 45 (ref 9–20)
CALCIUM SERPL-MCNC: 8.2 MG/DL (ref 8.6–10.4)
CALCIUM SERPL-MCNC: 8.7 MG/DL (ref 8.6–10.4)
CALCIUM SERPL-MCNC: 8.7 MG/DL (ref 8.6–10.4)
CALCIUM SERPL-MCNC: 8.9 MG/DL (ref 8.6–10.4)
CALCIUM SERPL-MCNC: 9 MG/DL (ref 8.6–10.4)
CALCIUM SERPL-MCNC: 9.1 MG/DL (ref 8.6–10.4)
CALCIUM SERPL-MCNC: 9.3 MG/DL (ref 8.6–10.4)
CASTS UA: ABNORMAL /LPF (ref 0–2)
CHLORIDE BLD-SCNC: 88 MMOL/L (ref 98–107)
CHLORIDE BLD-SCNC: 89 MMOL/L (ref 98–107)
CHLORIDE BLD-SCNC: 90 MMOL/L (ref 98–107)
CHLORIDE BLD-SCNC: 91 MMOL/L (ref 98–107)
CHLORIDE BLD-SCNC: 94 MMOL/L (ref 98–107)
CO2: 28 MMOL/L (ref 20–31)
CO2: 29 MMOL/L (ref 20–31)
CO2: 30 MMOL/L (ref 20–31)
CO2: 30 MMOL/L (ref 20–31)
CO2: 31 MMOL/L (ref 20–31)
COLOR: ABNORMAL
COMMENT UA: ABNORMAL
CREAT SERPL-MCNC: 0.46 MG/DL (ref 0.5–0.9)
CREAT SERPL-MCNC: 0.47 MG/DL (ref 0.5–0.9)
CREAT SERPL-MCNC: 0.48 MG/DL (ref 0.5–0.9)
CREAT SERPL-MCNC: 0.48 MG/DL (ref 0.5–0.9)
CREAT SERPL-MCNC: 0.53 MG/DL (ref 0.5–0.9)
CREAT SERPL-MCNC: 0.6 MG/DL (ref 0.5–0.9)
CREAT SERPL-MCNC: 0.9 MG/DL (ref 0.5–0.9)
CRYSTALS, UA: ABNORMAL /HPF
CULTURE: ABNORMAL
DIFFERENTIAL TYPE: ABNORMAL
EKG ATRIAL RATE: 99 BPM
EKG P AXIS: 74 DEGREES
EKG P-R INTERVAL: 194 MS
EKG Q-T INTERVAL: 380 MS
EKG QRS DURATION: 142 MS
EKG QTC CALCULATION (BAZETT): 487 MS
EKG R AXIS: 62 DEGREES
EKG T AXIS: 129 DEGREES
EKG VENTRICULAR RATE: 99 BPM
EOSINOPHILS RELATIVE PERCENT: 1 % (ref 1–4)
EPITHELIAL CELLS UA: ABNORMAL /HPF (ref 0–5)
GFR AFRICAN AMERICAN: >60 ML/MIN
GFR NON-AFRICAN AMERICAN: 59 ML/MIN
GFR NON-AFRICAN AMERICAN: >60 ML/MIN
GFR SERPL CREATININE-BSD FRML MDRD: ABNORMAL ML/MIN/{1.73_M2}
GLOBULIN: 2.7 G/DL (ref 1.5–3.8)
GLUCOSE BLD-MCNC: 102 MG/DL (ref 70–99)
GLUCOSE BLD-MCNC: 117 MG/DL (ref 70–99)
GLUCOSE BLD-MCNC: 119 MG/DL (ref 70–99)
GLUCOSE BLD-MCNC: 122 MG/DL (ref 70–99)
GLUCOSE BLD-MCNC: 129 MG/DL (ref 70–99)
GLUCOSE BLD-MCNC: 131 MG/DL (ref 70–99)
GLUCOSE BLD-MCNC: 198 MG/DL (ref 70–99)
GLUCOSE URINE: NEGATIVE
HCT VFR BLD CALC: 37.2 % (ref 36.3–47.1)
HCT VFR BLD CALC: 41.2 % (ref 36.3–47.1)
HEMOGLOBIN: 12.3 G/DL (ref 11.9–15.1)
HEMOGLOBIN: 13.9 G/DL (ref 11.9–15.1)
IMMATURE GRANULOCYTES: 2 %
INR BLD: 1.1
KETONES, URINE: NEGATIVE
LACTIC ACID: 2.9 MMOL/L (ref 0.5–2.2)
LEUKOCYTE ESTERASE, URINE: ABNORMAL
LEUKOCYTE ESTERASE, URINE: NEGATIVE
LEUKOCYTE ESTERASE, URINE: NEGATIVE
LIPASE: 38 U/L (ref 13–60)
LYMPHOCYTES # BLD: 29 % (ref 24–43)
Lab: ABNORMAL
MCH RBC QN AUTO: 29.4 PG (ref 25.2–33.5)
MCH RBC QN AUTO: 30 PG (ref 25.2–33.5)
MCHC RBC AUTO-ENTMCNC: 33.1 G/DL (ref 25.2–33.5)
MCHC RBC AUTO-ENTMCNC: 33.7 G/DL (ref 25.2–33.5)
MCV RBC AUTO: 88.8 FL (ref 82.6–102.9)
MCV RBC AUTO: 89 FL (ref 82.6–102.9)
MONOCYTES # BLD: 6 % (ref 3–12)
MUCUS: ABNORMAL
NITRITE, URINE: NEGATIVE
NRBC AUTOMATED: ABNORMAL PER 100 WBC
NRBC AUTOMATED: ABNORMAL PER 100 WBC
OTHER OBSERVATIONS UA: ABNORMAL
PDW BLD-RTO: 13.3 % (ref 11.8–14.4)
PDW BLD-RTO: 13.6 % (ref 11.8–14.4)
PH UA: 6 (ref 5–6)
PH UA: 6.5 (ref 5–6)
PH UA: 7 (ref 5–6)
PLATELET # BLD: 121 K/UL (ref 138–453)
PLATELET # BLD: 180 K/UL (ref 138–453)
PLATELET ESTIMATE: ABNORMAL
PMV BLD AUTO: 9.5 FL (ref 8.1–13.5)
PMV BLD AUTO: 9.6 FL (ref 8.1–13.5)
POTASSIUM SERPL-SCNC: 4 MMOL/L (ref 3.7–5.3)
POTASSIUM SERPL-SCNC: 4 MMOL/L (ref 3.7–5.3)
POTASSIUM SERPL-SCNC: 4.2 MMOL/L (ref 3.7–5.3)
POTASSIUM SERPL-SCNC: 4.3 MMOL/L (ref 3.7–5.3)
POTASSIUM SERPL-SCNC: 4.4 MMOL/L (ref 3.7–5.3)
PROTEIN UA: ABNORMAL
PROTEIN UA: NEGATIVE
PROTEIN UA: NEGATIVE
PROTHROMBIN TIME: 13.6 SEC (ref 11.5–14.2)
RBC # BLD: 4.18 M/UL (ref 3.95–5.11)
RBC # BLD: 4.64 M/UL (ref 3.95–5.11)
RBC # BLD: ABNORMAL 10*6/UL
RBC UA: ABNORMAL /HPF (ref 0–4)
RENAL EPITHELIAL, UA: ABNORMAL /HPF
SEG NEUTROPHILS: 62 % (ref 36–65)
SEGMENTED NEUTROPHILS ABSOLUTE COUNT: 6.27 K/UL (ref 1.5–8.1)
SODIUM BLD-SCNC: 131 MMOL/L (ref 135–144)
SODIUM BLD-SCNC: 132 MMOL/L (ref 135–144)
SODIUM BLD-SCNC: 133 MMOL/L (ref 135–144)
SODIUM BLD-SCNC: 135 MMOL/L (ref 135–144)
SODIUM BLD-SCNC: 136 MMOL/L (ref 135–144)
SPECIFIC GRAVITY UA: 1.01 (ref 1.01–1.02)
SPECIFIC GRAVITY UA: 1.02 (ref 1.01–1.02)
SPECIFIC GRAVITY UA: 1.03 (ref 1.01–1.02)
SPECIMEN DESCRIPTION: ABNORMAL
TOTAL PROTEIN: 6.7 G/DL (ref 6.4–8.3)
TRICHOMONAS: ABNORMAL
TROPONIN INTERP: ABNORMAL
TROPONIN T: ABNORMAL NG/ML
TROPONIN, HIGH SENSITIVITY: 32 NG/L (ref 0–14)
TSH SERPL DL<=0.05 MIU/L-ACNC: 3.28 MIU/L (ref 0.3–5)
TURBIDITY: ABNORMAL
URINE HGB: ABNORMAL
URINE HGB: ABNORMAL
URINE HGB: NEGATIVE
UROBILINOGEN, URINE: NORMAL
WBC # BLD: 10.1 K/UL (ref 3.5–11.3)
WBC # BLD: 10.1 K/UL (ref 3.5–11.3)
WBC # BLD: ABNORMAL 10*3/UL
WBC UA: >100 /HPF (ref 0–4)
WBC UA: ABNORMAL /HPF (ref 0–4)
WBC UA: ABNORMAL /HPF (ref 0–4)
YEAST: ABNORMAL

## 2020-01-01 PROCEDURE — 81001 URINALYSIS AUTO W/SCOPE: CPT

## 2020-01-01 PROCEDURE — 1036F TOBACCO NON-USER: CPT | Performed by: NURSE PRACTITIONER

## 2020-01-01 PROCEDURE — 80048 BASIC METABOLIC PNL TOTAL CA: CPT

## 2020-01-01 PROCEDURE — 83605 ASSAY OF LACTIC ACID: CPT

## 2020-01-01 PROCEDURE — 87077 CULTURE AEROBIC IDENTIFY: CPT

## 2020-01-01 PROCEDURE — 36415 COLL VENOUS BLD VENIPUNCTURE: CPT

## 2020-01-01 PROCEDURE — 82150 ASSAY OF AMYLASE: CPT

## 2020-01-01 PROCEDURE — 87086 URINE CULTURE/COLONY COUNT: CPT

## 2020-01-01 PROCEDURE — 83690 ASSAY OF LIPASE: CPT

## 2020-01-01 PROCEDURE — 1123F ACP DISCUSS/DSCN MKR DOCD: CPT | Performed by: NURSE PRACTITIONER

## 2020-01-01 PROCEDURE — 51702 INSERT TEMP BLADDER CATH: CPT

## 2020-01-01 PROCEDURE — G8417 CALC BMI ABV UP PARAM F/U: HCPCS | Performed by: NURSE PRACTITIONER

## 2020-01-01 PROCEDURE — 6360000002 HC RX W HCPCS: Performed by: FAMILY MEDICINE

## 2020-01-01 PROCEDURE — G8427 DOCREV CUR MEDS BY ELIG CLIN: HCPCS | Performed by: NURSE PRACTITIONER

## 2020-01-01 PROCEDURE — 84484 ASSAY OF TROPONIN QUANT: CPT

## 2020-01-01 PROCEDURE — G8484 FLU IMMUNIZE NO ADMIN: HCPCS | Performed by: NURSE PRACTITIONER

## 2020-01-01 PROCEDURE — 94760 N-INVAS EAR/PLS OXIMETRY 1: CPT

## 2020-01-01 PROCEDURE — 2580000003 HC RX 258: Performed by: EMERGENCY MEDICINE

## 2020-01-01 PROCEDURE — 2580000003 HC RX 258: Performed by: NURSE PRACTITIONER

## 2020-01-01 PROCEDURE — 2060000000 HC ICU INTERMEDIATE R&B

## 2020-01-01 PROCEDURE — 85610 PROTHROMBIN TIME: CPT

## 2020-01-01 PROCEDURE — 80076 HEPATIC FUNCTION PANEL: CPT

## 2020-01-01 PROCEDURE — 2700000000 HC OXYGEN THERAPY PER DAY

## 2020-01-01 PROCEDURE — 99285 EMERGENCY DEPT VISIT HI MDM: CPT

## 2020-01-01 PROCEDURE — 85025 COMPLETE CBC W/AUTO DIFF WBC: CPT

## 2020-01-01 PROCEDURE — 93005 ELECTROCARDIOGRAM TRACING: CPT | Performed by: EMERGENCY MEDICINE

## 2020-01-01 PROCEDURE — 92950 HEART/LUNG RESUSCITATION CPR: CPT

## 2020-01-01 PROCEDURE — 6360000002 HC RX W HCPCS: Performed by: EMERGENCY MEDICINE

## 2020-01-01 PROCEDURE — 1090F PRES/ABSN URINE INCON ASSESS: CPT | Performed by: NURSE PRACTITIONER

## 2020-01-01 PROCEDURE — 71045 X-RAY EXAM CHEST 1 VIEW: CPT

## 2020-01-01 PROCEDURE — 84443 ASSAY THYROID STIM HORMONE: CPT

## 2020-01-01 PROCEDURE — 85027 COMPLETE CBC AUTOMATED: CPT

## 2020-01-01 PROCEDURE — 99213 OFFICE O/P EST LOW 20 MIN: CPT | Performed by: NURSE PRACTITIONER

## 2020-01-01 PROCEDURE — 31500 INSERT EMERGENCY AIRWAY: CPT

## 2020-01-01 PROCEDURE — 4040F PNEUMOC VAC/ADMIN/RCVD: CPT | Performed by: NURSE PRACTITIONER

## 2020-01-01 RX ORDER — SELENIUM 50 MCG
TABLET ORAL
Qty: 90 CAPSULE | Refills: 0 | Status: ON HOLD | OUTPATIENT
Start: 2020-01-01 | End: 2020-01-01 | Stop reason: HOSPADM

## 2020-01-01 RX ORDER — ACETAMINOPHEN 325 MG/1
650 TABLET ORAL EVERY 6 HOURS PRN
Status: DISCONTINUED | OUTPATIENT
Start: 2020-01-01 | End: 2020-01-01 | Stop reason: HOSPADM

## 2020-01-01 RX ORDER — POTASSIUM CHLORIDE 20 MEQ/1
40 TABLET, EXTENDED RELEASE ORAL PRN
Status: DISCONTINUED | OUTPATIENT
Start: 2020-01-01 | End: 2020-01-01 | Stop reason: HOSPADM

## 2020-01-01 RX ORDER — SODIUM CHLORIDE 0.9 % (FLUSH) 0.9 %
10 SYRINGE (ML) INJECTION PRN
Status: DISCONTINUED | OUTPATIENT
Start: 2020-01-01 | End: 2020-01-01 | Stop reason: HOSPADM

## 2020-01-01 RX ORDER — MORPHINE SULFATE 2 MG/ML
2 INJECTION, SOLUTION INTRAMUSCULAR; INTRAVENOUS ONCE
Status: COMPLETED | OUTPATIENT
Start: 2020-01-01 | End: 2020-01-01

## 2020-01-01 RX ORDER — SODIUM CHLORIDE 0.9 % (FLUSH) 0.9 %
10 SYRINGE (ML) INJECTION EVERY 12 HOURS SCHEDULED
Status: DISCONTINUED | OUTPATIENT
Start: 2020-01-01 | End: 2020-01-01 | Stop reason: HOSPADM

## 2020-01-01 RX ORDER — CIPROFLOXACIN 500 MG/1
500 TABLET, FILM COATED ORAL 2 TIMES DAILY
Qty: 14 TABLET | Refills: 0 | Status: SHIPPED | OUTPATIENT
Start: 2020-01-01 | End: 2020-01-01 | Stop reason: ALTCHOICE

## 2020-01-01 RX ORDER — ACETAMINOPHEN 650 MG/1
650 SUPPOSITORY RECTAL EVERY 6 HOURS PRN
Status: DISCONTINUED | OUTPATIENT
Start: 2020-01-01 | End: 2020-01-01 | Stop reason: HOSPADM

## 2020-01-01 RX ORDER — POTASSIUM CHLORIDE 7.45 MG/ML
10 INJECTION INTRAVENOUS PRN
Status: DISCONTINUED | OUTPATIENT
Start: 2020-01-01 | End: 2020-01-01 | Stop reason: HOSPADM

## 2020-01-01 RX ORDER — OMEPRAZOLE 40 MG/1
CAPSULE, DELAYED RELEASE ORAL
Qty: 90 CAPSULE | Refills: 0 | Status: SHIPPED | OUTPATIENT
Start: 2020-01-01

## 2020-01-01 RX ORDER — ONDANSETRON 4 MG/1
4 TABLET, FILM COATED ORAL DAILY PRN
Qty: 30 TABLET | Refills: 0 | Status: ON HOLD | OUTPATIENT
Start: 2020-01-01 | End: 2020-01-01 | Stop reason: HOSPADM

## 2020-01-01 RX ORDER — MAGNESIUM SULFATE 1 G/100ML
1 INJECTION INTRAVENOUS PRN
Status: DISCONTINUED | OUTPATIENT
Start: 2020-01-01 | End: 2020-01-01 | Stop reason: HOSPADM

## 2020-01-01 RX ORDER — POLYETHYLENE GLYCOL 3350 17 G/17G
17 POWDER, FOR SOLUTION ORAL DAILY PRN
Status: DISCONTINUED | OUTPATIENT
Start: 2020-01-01 | End: 2020-01-01 | Stop reason: HOSPADM

## 2020-01-01 RX ORDER — SODIUM CHLORIDE 9 MG/ML
INJECTION, SOLUTION INTRAVENOUS CONTINUOUS
Status: DISCONTINUED | OUTPATIENT
Start: 2020-01-01 | End: 2020-01-01 | Stop reason: HOSPADM

## 2020-01-01 RX ORDER — PROMETHAZINE HYDROCHLORIDE 25 MG/1
12.5 TABLET ORAL EVERY 6 HOURS PRN
Status: DISCONTINUED | OUTPATIENT
Start: 2020-01-01 | End: 2020-01-01 | Stop reason: HOSPADM

## 2020-01-01 RX ORDER — EPINEPHRINE 1 MG/ML
INJECTION, SOLUTION, CONCENTRATE INTRAVENOUS DAILY PRN
Status: DISCONTINUED | OUTPATIENT
Start: 2020-01-01 | End: 2020-01-01 | Stop reason: HOSPADM

## 2020-01-01 RX ORDER — SODIUM CHLORIDE 9 MG/ML
INJECTION, SOLUTION INTRAVENOUS CONTINUOUS
Status: DISCONTINUED | OUTPATIENT
Start: 2020-01-01 | End: 2020-01-01

## 2020-01-01 RX ORDER — ONDANSETRON 2 MG/ML
4 INJECTION INTRAMUSCULAR; INTRAVENOUS EVERY 6 HOURS PRN
Status: DISCONTINUED | OUTPATIENT
Start: 2020-01-01 | End: 2020-01-01 | Stop reason: HOSPADM

## 2020-01-01 RX ORDER — AMOXICILLIN AND CLAVULANATE POTASSIUM 875; 125 MG/1; MG/1
1 TABLET, FILM COATED ORAL 2 TIMES DAILY
Qty: 20 TABLET | Refills: 0 | Status: SHIPPED | OUTPATIENT
Start: 2020-01-01 | End: 2020-01-01

## 2020-01-01 RX ADMIN — MORPHINE SULFATE 2 MG: 2 INJECTION, SOLUTION INTRAMUSCULAR; INTRAVENOUS at 14:53

## 2020-01-01 RX ADMIN — EPINEPHRINE 1 MG: 1 INJECTION, SOLUTION INTRAMUSCULAR; SUBCUTANEOUS at 17:53

## 2020-01-01 RX ADMIN — SODIUM CHLORIDE: 9 INJECTION, SOLUTION INTRAVENOUS at 18:00

## 2020-01-01 RX ADMIN — SODIUM CHLORIDE: 9 INJECTION, SOLUTION INTRAVENOUS at 20:23

## 2020-01-01 RX ADMIN — SODIUM CHLORIDE: 9 INJECTION, SOLUTION INTRAVENOUS at 02:54

## 2020-01-01 ASSESSMENT — ENCOUNTER SYMPTOMS
VOMITING: 0
NAUSEA: 0
RESPIRATORY NEGATIVE: 1

## 2020-01-01 ASSESSMENT — PATIENT HEALTH QUESTIONNAIRE - PHQ9
SUM OF ALL RESPONSES TO PHQ9 QUESTIONS 1 & 2: 0
SUM OF ALL RESPONSES TO PHQ QUESTIONS 1-9: 0
DEPRESSION UNABLE TO ASSESS: PT REFUSES
SUM OF ALL RESPONSES TO PHQ QUESTIONS 1-9: 0
2. FEELING DOWN, DEPRESSED OR HOPELESS: 0
1. LITTLE INTEREST OR PLEASURE IN DOING THINGS: 0

## 2020-01-01 ASSESSMENT — PAIN SCALES - WONG BAKER: WONGBAKER_NUMERICALRESPONSE: 0

## 2020-01-10 NOTE — PROGRESS NOTES
[facility]             [address], [address]                        Telephone [phone]             Fax [phone]     Antoine Rodriguez  10/26/1928  GEF:[id number]   Date of visit:  1/10/2020    Subjective:    Antoine Rodriguez is a 80 y.o.  female who presents to [facility] today (1/10/2020) for evaluation of:    Chief Complaint   Patient presents with    Urinary Tract Infection     frequency x1 week. Urinary Tract Infection    This is a new problem. The current episode started in the past 7 days. The problem occurs every urination. The problem has been gradually worsening. The patient is experiencing no pain. There has been no fever. Associated symptoms include frequency and urgency. Pertinent negatives include no chills, discharge, flank pain, hematuria, nausea or vomiting. Associated symptoms comments: Intermittent weakness; recent history of hyponatremia and patient and son requesting blood work for sodium level; last drawn on 12/30/19. Alejandra Lynch She has tried increased fluids for the symptoms. The treatment provided no relief.        She has the following problem list:  Patient Active Problem List   Diagnosis    Hypertension    Other hyperlipidemia    Hypothyroidism    Osteopenia    Hyperopia of both eyes with astigmatism and presbyopia    Cataract    Macular degeneration, dry    Hyponatremia    Non-intractable vomiting    Abdominal aortic aneurysm (AAA) 30 to 34 mm in diameter [facility])        Current medications are:  Current Outpatient Medications   Medication Sig Dispense Refill    ciprofloxacin (CIPRO) 500 MG tablet Take 1 tablet by mouth 2 times daily for 7 days 14 tablet 0    furosemide (LASIX) 20 MG tablet Take 10 mg by mouth daily May increase to 40mg if needed       carvedilol (COREG) 12.5 MG tablet Take 12.5 mg by mouth 2 times daily (with meals)      potassium chloride Negative for chills. Respiratory: Negative. Cardiovascular: Negative. Gastrointestinal: Negative for nausea and vomiting. Genitourinary: Positive for frequency and urgency. Negative for dysuria, flank pain, hematuria, pelvic pain and vaginal discharge. Physical Exam  Vitals signs and nursing note reviewed. Constitutional:       Appearance: She is well-developed. HENT:      Head: Normocephalic. Eyes:      Pupils: Pupils are equal, round, and reactive to light. Neck:      Musculoskeletal: Normal range of motion and neck supple. Cardiovascular:      Rate and Rhythm: Normal rate and regular rhythm. Heart sounds: Normal heart sounds. Pulmonary:      Effort: Pulmonary effort is normal.      Breath sounds: Normal breath sounds. Abdominal:      General: Bowel sounds are normal.      Palpations: Abdomen is soft. Tenderness: There is no tenderness. There is no right CVA tenderness or left CVA tenderness. Skin:     General: Skin is warm and dry. Neurological:      Mental Status: She is alert and oriented to person, place, and time. Psychiatric:         Behavior: Behavior normal.         Thought Content:  Thought content normal.       Assessment and Plan:    Hospital Outpatient Visit on 01/10/2020   Component Date Value Ref Range Status    Color, UA 01/10/2020 NOT REPORTED  YELLOW Final    Turbidity UA 01/10/2020 NOT REPORTED  CLEAR Final    Glucose, Ur 01/10/2020 NEGATIVE  NEGATIVE Final    Bilirubin Urine 01/10/2020 NEGATIVE  NEGATIVE Final    Ketones, Urine 01/10/2020 NEGATIVE  NEGATIVE Final    Specific Gravity, UA 01/10/2020 1.015  1.010 - 1.025 Final    Urine Hgb 01/10/2020 TRACE* NEGATIVE Final    pH, UA 01/10/2020 7.0* 5.0 - 6.0 Final    Protein, UA 01/10/2020 NEGATIVE  NEGATIVE Final    Urobilinogen, Urine 01/10/2020 Normal  Normal Final    Nitrite, Urine 01/10/2020 NEGATIVE  NEGATIVE Final    Leukocyte Esterase, Urine 01/10/2020 1+* NEGATIVE Final    Urinalysis Comments 01/10/2020 NOT REPORTED   Final    - 01/10/2020        Final    WBC, UA 01/10/2020 >100  0 - 4 /HPF Final    RBC, UA 01/10/2020 0 TO 4  0 - 4 /HPF Final    Casts UA 01/10/2020 NOT REPORTED  0 - 2 /LPF Final    Crystals UA 01/10/2020 NOT REPORTED  None /HPF Final    Epithelial Cells UA 01/10/2020 0 TO 4  0 - 5 /HPF Final    Renal Epithelial, Urine 01/10/2020 NOT REPORTED  0 /HPF Final    Bacteria, UA 01/10/2020 3+* None Final    Mucus, UA 01/10/2020 NOT REPORTED  None Final    Trichomonas, UA 01/10/2020 NOT REPORTED  None Final    Amorphous, UA 01/10/2020 NOT REPORTED  None Final    Other Observations UA 01/10/2020 Specimen Cultured* NOT REQ. Final    Yeast, UA 01/10/2020 NOT REPORTED  None Final          Diagnosis Orders   1. Acute cystitis without hematuria  ciprofloxacin (CIPRO) 500 MG tablet   2. Urinary frequency  Urinalysis Reflex to Culture   3. Weakness  Basic Metabolic Panel     I reviewed labs with patient. Complete full course of antibiotic. Increase water intake. Call or return to clinic as needed if these symptoms worsen or fail to improve in the next 48 hours. If urine culture was sent, the patient will be notified of results. We will call with lab results. The use, risks, benefits, and side effects of prescribed or recommended medications were discussed. All questions were answered and the patient/caregiver voiced understanding. No orders of the defined types were placed in this encounter.         Electronically signed by CELSA Hernandez CNP on 1/10/20 at 4:51 PM

## 2020-05-12 NOTE — TELEPHONE ENCOUNTER
From: [employer]  To: Prabhjot Younger MD  Sent: 5/8/2020 1:26 PM EDT  Subject: Non-Urgent Medical Question    Could you please order me a low dose pill for my nerves. I used take a nerve pill on and off for years but have not taken any for several years. I do not know the name of the nerve pill. Could you also order the lab work which I will need for my upcoming, six month, doctor appointment on 4/26.  Thanks

## 2020-05-15 PROBLEM — I46.9 CARDIAC ARREST (HCC): Status: ACTIVE | Noted: 2020-01-01

## 2020-05-15 NOTE — ED NOTES
Family at bedside. Granddaughter states she will take the patient's clothes, dentures and hearing aid.       Darron Friedman RN  05/15/20 [id number]

## 2020-05-15 NOTE — ED PROVIDER NOTES
[address] [facility] ED  [address]  Phone: [phone]  [place]      Pt Name: Gosia Canela  MRN: [id number]  [place] 10/26/1928  Date of evaluation: 5/15/2020    CHIEF COMPLAINT       Chief Complaint   Patient presents with    Cardiac Arrest       HISTORY OF PRESENT ILLNESS    Gosia Canela is a 80 y.o. female who presents to the emergency department in cardiac arrest.  Patient had been complaining of not feeling well over the past 2 days but worse today. She was complaining of some shortness of breath and feeling cold. On arrival by paramedics and being transferred over to the [facility] patient went unresponsive and CPR was initiated. She went bradycardic before asystole. On arrival [place] compressions being performed. LMA in place. Intubated on arrival with a 7.5 ET tube and talked with daughter who had the patient's living will and did not want her intubated or CPR continued. She had been given 1 round of epi prior to discussion and patient after being extubated had a pulse. She was breathing on her own. Unresponsive otherwise. REVIEW OF SYSTEMS     Unable to obtain secondary to patient's condition  PAST MEDICAL HISTORY    has a past medical history of Cervical osteoarthritis, Dry eye syndrome, Early cataracts, bilateral, Euthyroid goiter, Hypertension, Hypertriglyceridemia, Hypokalemia, Hypothyroidism, Low back pain, Macular degeneration, Navicular fracture, Onychomycosis, Osteopenia, Pneumonia, Ptosis, and Vitreous degeneration. SURGICAL HISTORY      has a past surgical history that includes Appendectomy and Tonsillectomy and adenoidectomy (2456). CURRENT MEDICATIONS       Previous Medications    AMLODIPINE (NORVASC) 2.5 MG TABLET    Take 1 tablet by mouth daily    ASCORBIC ACID (VITAMIN C) 500 MG TABLET    Take 500 mg by mouth daily 2 tabs daily    ASPIRIN 81 MG TABLET    Take 81 mg by mouth daily.     BENAZEPRIL (LOTENSIN) 40 MG TABLET Take 1 tablet by mouth daily    CARVEDILOL (COREG) 12.5 MG TABLET    Take 12.5 mg by mouth 2 times daily (with meals)    CLONIDINE (CATAPRES) 0.1 MG TABLET    Take 1 tablet by mouth 2 times daily    COENZYME Q10 (COQ10) 100 MG CAPS    Take 100 mg by mouth daily     DOCUSATE SODIUM (COLACE, DULCOLAX) 100 MG CAPS    Take 100 mg by mouth 2 times daily    FUROSEMIDE (LASIX) 20 MG TABLET    Take 10 mg by mouth daily May increase to 40mg if needed     GLUCOSAMINE-CHONDROITIN (GLUCOSAMINE CHONDR COMPLEX PO)    Take by mouth    LACTOBACILLUS (ACIDOPHILUS) CAPS CAPSULE    take 1 capsule by mouth once daily    LEVOTHYROXINE (SYNTHROID) 75 MCG TABLET    take 1 tablet by mouth once daily    LUTEIN 10 MG TABS    Take 1 tablet by mouth daily    MAGNESIUM 200 MG TABS    Take  by mouth daily. MULTIPLE VITAMINS-MINERALS (PRESERVISION AREDS) TABS    Take by mouth    OMEPRAZOLE (PRILOSEC) 40 MG DELAYED RELEASE CAPSULE    take 1 capsule by mouth once daily    ONDANSETRON (ZOFRAN) 4 MG TABLET    Take 1 tablet by mouth daily as needed for Nausea or Vomiting    POLYVINYL ALCOHOL-POVIDONE (REFRESH OP)    Apply to eye nightly    POTASSIUM CHLORIDE (KLOR-CON) 10 MEQ EXTENDED RELEASE TABLET    Take 10 mEq by mouth 3 times daily    SENNOSIDES (EX-LAX PO)    Take by mouth as needed    SODIUM CHLORIDE PO    Take 1 g by mouth 3 times daily       ALLERGIES     has No Known Allergies. FAMILY HISTORY     She indicated that her mother is . She indicated that her father is . She indicated that the status of her sister is unknown. She indicated that the status of her daughter is unknown. She indicated that the status of her neg hx is unknown.     family history includes Glaucoma in her daughter; High Blood Pressure in her mother; Other in her daughter; Stroke in her father and sister. SOCIAL HISTORY      reports that she has never smoked.  She has never used smokeless tobacco. She reports that she does not drink alcohol or use drugs.    PHYSICAL EXAM       ED Triage Vitals     Constitutional: Unresponsive CPR in progress  HEENT: 4 mm fixed  Neck: Trachea midline   Cardiovascular: Pulse with compressions   respiratory: No spontaneous respirations. Coarse breath sounds with bagging  Gastrointestinal: Soft, , nondistended,  Musculoskeletal: No extremity pain or swelling   Neurologic: No purposeful movements  Skin: Cool to touch dry    DIFFERENTIAL DIAGNOSIS/ MDM:     CPR initially started in route and discontinued on arrival after intubation. ET tube removed. Awaiting labs and x-ray. DIAGNOSTIC RESULTS     EKG: All EKG's are interpreted by theEmergency Department Physician who either signs or Co-signs this chart in the absence of a cardiologist.     Erna Funez left bundle branch block rate 99    appropriate discordance.     Not indicated unless otherwise documented above    LABS:  Results for orders placed or performed during the hospital encounter of 05/15/20   CBC Auto Differential   Result Value Ref Range    WBC 10.1 3.5 - 11.3 k/uL    RBC 4.64 3.95 - 5.11 m/uL    Hemoglobin 13.9 11.9 - 15.1 g/dL    Hematocrit 41.2 36.3 - 47.1 %    MCV 88.8 82.6 - 102.9 fL    MCH 30.0 25.2 - 33.5 pg    MCHC 33.7 (H) 25.2 - 33.5 g/dL    RDW 13.6 11.8 - 14.4 %    Platelets 853 738 - 794 k/uL    MPV 9.6 8.1 - 13.5 fL    NRBC Automated NOT REPORTED 0.0 per 100 WBC    Differential Type NOT REPORTED     WBC Morphology NOT REPORTED     RBC Morphology NOT REPORTED     Platelet Estimate NOT REPORTED     Seg Neutrophils 62 36 - 65 %    Lymphocytes 29 24 - 43 %    Monocytes 6 3 - 12 %    Eosinophils % 1 1 - 4 %    Basophils 0 0 - 2 %    Immature Granulocytes 2 (H) 0 %    Segs Absolute 6.27 1.50 - 8.10 k/uL    Absolute Lymph # 2.97 1.10 - 3.70 k/uL    Absolute Mono # 0.60 0.10 - 1.20 k/uL    Absolute Eos # 0.11 0.00 - 0.44 k/uL    Basophils Absolute 0.03 0.00 - 0.20 k/uL    Absolute Immature Granulocyte 0.15 0.00 - 0.30 k/uL   Basic Metabolic Panel Result Value Ref Range    Glucose 198 (H) 70 - 99 mg/dL    BUN 18 8 - 23 mg/dL    CREATININE 0.60 0.50 - 0.90 mg/dL    Bun/Cre Ratio 30 (H) 9 - 20    Calcium 8.7 8.6 - 10.4 mg/dL    Sodium 131 (L) 135 - 144 mmol/L    Potassium 4.4 3.7 - 5.3 mmol/L    Chloride 89 (L) 98 - 107 mmol/L    CO2 31 20 - 31 mmol/L    Anion Gap 11 9 - 17 mmol/L    GFR Non-African American >60 >60 mL/min    GFR African American >60 >60 mL/min    GFR Comment          GFR Staging NOT REPORTED    Troponin   Result Value Ref Range    Troponin, High Sensitivity 32 (H) 0 - 14 ng/L    Troponin T NOT REPORTED <0.03 ng/mL    Troponin Interp NOT REPORTED    Hepatic Function Panel   Result Value Ref Range    Alb 4.0 3.5 - 5.2 g/dL    Alkaline Phosphatase 147 (H) 35 - 104 U/L    ALT 16 5 - 33 U/L    AST 21 <32 U/L    Total Bilirubin 0.68 0.3 - 1.2 mg/dL    Bilirubin, Direct 0.17 <0.31 mg/dL    Bilirubin, Indirect 0.51 0.00 - 1.00 mg/dL    Total Protein 6.7 6.4 - 8.3 g/dL    Globulin 2.7 1.5 - 3.8 g/dL    Albumin/Globulin Ratio 1.5 1.0 - 2.5   Lipase   Result Value Ref Range    Lipase 38 13 - 60 U/L   Amylase   Result Value Ref Range    Amylase 59 28 - 100 U/L   Lactic Acid   Result Value Ref Range    Lactic Acid 2.9 (H) 0.5 - 2.2 mmol/L   EKG 12 Lead   Result Value Ref Range    Ventricular Rate 99 BPM    Atrial Rate 99 BPM    P-R Interval 194 ms    QRS Duration 142 ms    Q-T Interval 380 ms    QTc Calculation (Bazett) 487 ms    P Axis 74 degrees    R Axis 62 degrees    T Axis 129 degrees       Not indicated unless otherwise documented above    RADIOLOGY:   I reviewed the radiologist interpretations:    XR CHEST PORTABLE   Final Result   Cardiomegaly, pulmonary vascular congestion and fluid in the minor fissure on   the right. Scattered pulmonary opacities are noted which may be related to   edema or multifocal airspace disease.              Not indicated unless otherwise documented above    EMERGENCY DEPARTMENT COURSE:     The patient was given the following medications:  Orders Placed This Encounter   Medications    0.9 % sodium chloride infusion    EPINEPHrine PF 1 MG/ML injection        Vitals:   -------------------------  Pulse 91     635 PM spontaneous respirations. No purposeful movements. Awaiting labs. Family at bedside. 7:15 PM labs unremarkable. Patient unresponsive maintaining herself on nonrebreather and IV fluids. Will admit for observation. Family at bedside agreeable to making her DNR CC. Awaiting callback. 7:25 PM responsive to voice by groaning but nothing purposeful otherwise. Discussed with Tr Lopez who agrees to admission. I have reviewed the disposition diagnosis with the patient and or their family/guardian. I have answered their questions and given discharge instructions. They voiced understanding of these instructions and did not have any furtherquestions or complaints. CRITICAL CARE:    None    CONSULTS:    None    PROCEDURES:    None      OARRS Report if indicated             FINAL IMPRESSION      1. Cardiac arrest ([facility] [address]    2. Unresponsive          DISPOSITION/PLAN   DISPOSITION          CONDITION ON DISPOSITION: STABLE       PATIENT REFERRED TO:  No follow-up provider specified.     DISCHARGE MEDICATIONS:  New Prescriptions    No medications on file       (Please note that portions of thisnote were completed with a voice recognition program.  Efforts were made to edit the dictations but occasionally words are mis-transcribed.)    Drew DO  Attending Emergency Physician        Justina Bower,   05/15/20 1927

## 2020-05-16 NOTE — H&P
Deborah [address]                 [address] [place] [id number]                              HISTORY AND PHYSICAL    PATIENT NAME: Kirstin Barrett                    :        10/26/1928  MED REC NO:   [id number]                             ROOM:       [id number]  ACCOUNT NO:   [de-identified]                           ADMIT DATE: 05/15/2020  PROVIDER:     Juan Martínez    CHIEF COMPLAINT:  Unresponsive with Cardiac arrest.    HISTORY OF PRESENT ILLNESS:  This is a 54-year-old female who was  brought to the ER by EMS after she had a cardiac arrest at home. As per  the family, the patient has not been feeling well for the past 2 days. Her blood pressure had been elevated, also was complaining of feeling  weak and tired, and EMS was called. Prior to EMS arrival, the patient  went unresponsive. The patient received CPR en route and upon arrival  to the ER, she went into bradycardia and asystole. She was intubated  and CPR was initiated. However, the patient has had a living will and  family did not want any intubation or CPR continued so the patient was  extubated. Since then, she has been breathing on her own with  supplemental oxygen, non-rebreather mask; also had had a pulse. The  patient has remained unresponsive since. PAST MEDICAL HISTORY:  History of hypertension, hypothyroidism,  hyperkalemia, osteoarthritis, chronic low back pain, macular  degeneration of the eyes. Diastolic dysfunction grade II. PAST SURGICAL HISTORY:  She has undergone appendectomy, tonsillectomy  and adenoidectomy in the past.    FAMILY HISTORY:  Positive for a stroke, hypertension. SOCIAL HABITS:  No history of alcohol or smoking. REVIEW OF SYSTEMS:  Unable to obtain review of systems from the patient;  however, based on history;  GENERAL:  Was feeling weak. CARDIAC WISE:  Has had no chest pain.   RESPIRATORY:  Has had some shortness of breath prior to her cardiac  arrest.  GASTROINTESTINAL:  No known complaints of nausea, emesis, diarrhea or  constipation. GENITOURINARY:  No known complaints. MUSCULOSKELETAL WISE:  Complained of generalized weakness about a day or  two prior to this episode. Rest of review of systems are unremarkable based on history obtained  from the family. HOME MEDICATIONS:  The patient was on omeprazole 40 mg a day, Coreg 12.5  mg twice a day, Lasix 20 mg a day, potassium chloride 10 mEq daily,  amlodipine 2.5 mg daily, levothyroxine 75 mcg daily, Lotensin 40 mg a  day, magnesium 200 mg a day, aspirin 81 mg a day. ALLERGIES:  No known drug allergies. PHYSICAL EXAMINATION:  GENERAL:  On exam, the patient is nonresponsive. VITAL SIGNS:  With a temp of 97.5, pulse 81,   on non-rebreather mask, O2 sats were 92%, blood pressure 115/54. HEENT:  Pupils are small bilaterally. NECK:   Supple. No JVD or bruits. LUNGS:  Diminished in both bases. HEART:  Regular rate and rhythm. ABDOMEN:  Obese, soft, nontender. EXTREMITIES:  Has got bilateral lower extremity edema, 1+. No calf  tenderness. NEURO:  The patient is nonresponsive. No spontaneous limb movements. ANCILLARY STUDIES:  In the ER, sodium 132, potassium 4.3, chloride 94,  BUN is 45, creatinine of _____. White count was 7.1, hemoglobin 12.3,  hematocrit 37.2. Chest x-ray in the ER showed cardiomegaly with  pulmonary vascular congestion and some scattered pulmonary opacities. EKG, normal sinus rhythm with left bundle branch block. Her troponin  was elevated at 32. ASSESSMENT:  1. Unresponsive state status post cardiac arrest.  2.  History of hypertension. PLAN:  The patient was admitted as a [facility] for comfort care measures. Family opted for inpatient hospice, so hospice has been consulted. The  patient will be transferred to inpatient hospice care later today. She  is currently on gentle IV hydration and receiving supplemental oxygen  via non-rebreather mask. Continue with comfort care measures.         BRADLEY BARRAZA    D: 05/16/2020 12:21:37       T: 05/16/2020 13:25:58     [email or web address]  Job#: [id number]     Doc#: [id number]    CC:

## 2020-05-16 NOTE — PLAN OF CARE
Problem: Falls - Risk of:  Goal: Will remain free from falls  Description: Will remain free from falls  Outcome: Ongoing  Goal: Absence of physical injury  Description: Absence of physical injury  Outcome: Ongoing     Problem: Safety:  Goal: Free from accidental physical injury  Description: Free from accidental physical injury  Outcome: Ongoing  Goal: Free from intentional harm  Description: Free from intentional harm  Outcome: Ongoing     Problem: Daily Care:  Goal: Daily care needs are met  Description: Daily care needs are met  Outcome: Ongoing     Problem: Pain:  Goal: Patient's pain/discomfort is manageable  Description: Patient's pain/discomfort is manageable  Outcome: Ongoing

## 2020-05-16 NOTE — PROGRESS NOTES
Discharge teaching and instructions for diagnosis/procedure of cardiac arrest completed with daughter and son of the patient. Report called to Sangita at inpatient hospice. Unit phone number given to nurse if questions arise.

## 2020-05-16 NOTE — DISCHARGE SUMMARY
Discharge Summary    Carlos Eduardo Reddy Patient Status:  Inpatient    10/26/1928 MRN [id number]   Location [address] [facility] Attending Devonda Boeck, MD   Hosp Day # 1 PCP Esther Allen MD     Date of Admission: 5/15/2020    Date of Discharge: 2020    Admitting Diagnosis: Cardiac arrest [facility]) [I46.9]    Discharge Diagnosis: Active Problems:    Cardiac arrest [facility])  Resolved Problems:    * No resolved hospital problems. [place] state s/p cardiac arrest    Reason for Admission/HPI: Patient was in cardiac arrest on ER presentation was intubated and  CPR was intitiatd. Patient had a  living will so family wanted no intubation or CPR,after discussion with the family  patient was extubated in the ER ,has been nonresponsive with a pulse  and has been breathing on her own with supplemental oxygen. Hospital Course: Patient was admitted as DNR cc was on IV fluids and mostly comfort care,has remained unresponsive since her cardiac arrest in the ER,has been on a non rebreather mask for oxygenation. Family opted for inpatient hospice care. Consultations: In patient Hopsice    Procedures: None    Complications: Post cardiac arrest has remained unresponsive    Disposition: Inpatient hospice    Discharge Condition: critical    Discharge Medications:    Erika Garcia Medication Instructions IGH:[id number]    Printed on:20 1153   Medication Information                      amLODIPine (NORVASC) 2.5 MG tablet  Take 1 tablet by mouth daily             aspirin 81 MG tablet  Take 81 mg by mouth daily.              benazepril (LOTENSIN) 40 MG tablet  Take 1 tablet by mouth daily             carvedilol (COREG) 12.5 MG tablet  Take 12.5 mg by mouth 2 times daily (with meals)             cloNIDine (CATAPRES) 0.1 MG tablet  Take 1 tablet by mouth 2 times daily             furosemide (LASIX) 20 MG tablet  Take 10 mg by mouth daily May increase to 40mg if needed              levothyroxine (SYNTHROID) 75 MCG tablet  take 1 tablet by mouth once daily             Lutein 10 MG TABS  Take 1 tablet by mouth daily             Magnesium 200 MG TABS  Take  by mouth daily.              omeprazole (PRILOSEC) 40 MG delayed release capsule  take 1 capsule by mouth once daily             potassium chloride (KLOR-CON) 10 MEQ extended release tablet  Take 10 mEq by mouth 3 times daily             Sennosides (EX-LAX PO)  Take by mouth as needed                       Total Time spent with patient and coordinating care:  40 minutes    Nishi Rush MD  5/16/2020  11:53 AM

## 2020-05-16 NOTE — PROGRESS NOTES
Family at bedside with patient. Pt resting on non-rebreather mask. SpO2 87%.  Will continue to monitor

## 2020-05-16 NOTE — PLAN OF CARE
Problem: Falls - Risk of:  Goal: Will remain free from falls  Description: Will remain free from falls  5/16/2020 0900 by Valencia Fajardo RN  Outcome: Ongoing  Note: Patient is a comfort care, discussed with family. Hourly visual checks performed and charted. Patient has a beltran. No falls this shift, at any time. Arm band and falling star in place. Will continue to monitor. Problem: Safety:  Goal: Free from accidental physical injury  Description: Free from accidental physical injury  5/16/2020 0900 by Valencia Fajardo RN  Outcome: Ongoing  Note:  No falls this shift. Bed in lowest position. Wheels locked. Correct ID in place. Will continue to monitor. Problem: Pain:  Goal: Patient's pain/discomfort is manageable  Description: Patient's pain/discomfort is manageable  5/16/2020 0900 by Valencia Fajardo RN  Outcome: Ongoing  Note: Patient is unable to use a 1-10 scale at this time. Patients pain is being assessed and monitored by using the FACES scale. FACES scale 0 at this time. Will continue to assess patients pain and if the patient is in pain, non-pharmacological methods will be used first including re-postioning, heat, ice, etc. If ineffective will give PRN pain medications per order to promote comfort. Problem: Discharge Planning:  Goal: Discharged to appropriate level of care  Description: Discharged to appropriate level of care  Outcome: Ongoing  Note: Discharge planning in process and discussed with patient/family. Patient will be discharge inpatient hospice. Problem: Skin Integrity:  Goal: Absence of new skin breakdown  Description: Absence of new skin breakdown  Outcome: Ongoing  Note: No signs of skin breakdown. Skin warm, dry, and intact. Patients family does not want patient turned at this time. .  Will continue to monitor. Care plan reviewed with patient. Patient verbalize understanding of the plan of care and contribute to goal setting.

## 2020-05-18 LAB
CULTURE: ABNORMAL
Lab: ABNORMAL
SPECIMEN DESCRIPTION: ABNORMAL

## 2022-01-03 ENCOUNTER — TELEPHONE (OUTPATIENT)
Dept: FAMILY MEDICINE CLINIC | Age: 87
End: 2022-01-03

## 2022-01-03 NOTE — TELEPHONE ENCOUNTER
Liudmila Gutierrez calling stating pt had a health reimbursement account and they told him to get a note stating it was medically necessary for pt to get a lift chair and then they will pay for it, please advise son at above number.